# Patient Record
Sex: MALE | Race: WHITE | NOT HISPANIC OR LATINO | URBAN - METROPOLITAN AREA
[De-identification: names, ages, dates, MRNs, and addresses within clinical notes are randomized per-mention and may not be internally consistent; named-entity substitution may affect disease eponyms.]

---

## 2017-07-12 ENCOUNTER — OUTPATIENT (OUTPATIENT)
Dept: OUTPATIENT SERVICES | Facility: HOSPITAL | Age: 67
LOS: 1 days | Discharge: HOME | End: 2017-07-12

## 2017-07-12 DIAGNOSIS — Z01.810 ENCOUNTER FOR PREPROCEDURAL CARDIOVASCULAR EXAMINATION: ICD-10-CM

## 2017-07-12 DIAGNOSIS — F10.239 ALCOHOL DEPENDENCE WITH WITHDRAWAL, UNSPECIFIED: ICD-10-CM

## 2017-07-12 DIAGNOSIS — E03.9 HYPOTHYROIDISM, UNSPECIFIED: ICD-10-CM

## 2017-07-12 DIAGNOSIS — I25.10 ATHEROSCLEROTIC HEART DISEASE OF NATIVE CORONARY ARTERY WITHOUT ANGINA PECTORIS: ICD-10-CM

## 2017-07-12 DIAGNOSIS — E78.00 PURE HYPERCHOLESTEROLEMIA, UNSPECIFIED: ICD-10-CM

## 2017-11-10 ENCOUNTER — OUTPATIENT (OUTPATIENT)
Dept: OUTPATIENT SERVICES | Facility: HOSPITAL | Age: 67
LOS: 1 days | Discharge: HOME | End: 2017-11-10

## 2017-11-10 DIAGNOSIS — E03.9 HYPOTHYROIDISM, UNSPECIFIED: ICD-10-CM

## 2017-11-10 DIAGNOSIS — F10.239 ALCOHOL DEPENDENCE WITH WITHDRAWAL, UNSPECIFIED: ICD-10-CM

## 2017-11-10 DIAGNOSIS — I25.10 ATHEROSCLEROTIC HEART DISEASE OF NATIVE CORONARY ARTERY WITHOUT ANGINA PECTORIS: ICD-10-CM

## 2017-11-10 DIAGNOSIS — Z01.810 ENCOUNTER FOR PREPROCEDURAL CARDIOVASCULAR EXAMINATION: ICD-10-CM

## 2018-03-30 ENCOUNTER — OUTPATIENT (OUTPATIENT)
Dept: OUTPATIENT SERVICES | Facility: HOSPITAL | Age: 68
LOS: 1 days | Discharge: HOME | End: 2018-03-30

## 2018-03-30 DIAGNOSIS — I25.10 ATHEROSCLEROTIC HEART DISEASE OF NATIVE CORONARY ARTERY WITHOUT ANGINA PECTORIS: ICD-10-CM

## 2018-03-30 DIAGNOSIS — Z01.20 ENCOUNTER FOR DENTAL EXAMINATION AND CLEANING WITHOUT ABNORMAL FINDINGS: ICD-10-CM

## 2018-03-30 DIAGNOSIS — E78.00 PURE HYPERCHOLESTEROLEMIA, UNSPECIFIED: ICD-10-CM

## 2020-08-12 ENCOUNTER — APPOINTMENT (OUTPATIENT)
Dept: CARDIOLOGY | Facility: CLINIC | Age: 70
End: 2020-08-12
Payer: COMMERCIAL

## 2020-08-12 VITALS
WEIGHT: 237 LBS | BODY MASS INDEX: 38.09 KG/M2 | HEIGHT: 66 IN | SYSTOLIC BLOOD PRESSURE: 180 MMHG | DIASTOLIC BLOOD PRESSURE: 100 MMHG

## 2020-08-12 DIAGNOSIS — Z86.79 PERSONAL HISTORY OF OTHER DISEASES OF THE CIRCULATORY SYSTEM: ICD-10-CM

## 2020-08-12 DIAGNOSIS — Z87.891 PERSONAL HISTORY OF NICOTINE DEPENDENCE: ICD-10-CM

## 2020-08-12 DIAGNOSIS — Z78.9 OTHER SPECIFIED HEALTH STATUS: ICD-10-CM

## 2020-08-12 DIAGNOSIS — I50.1 LEFT VENTRICULAR FAILURE, UNSPECIFIED: ICD-10-CM

## 2020-08-12 DIAGNOSIS — Z98.61 CORONARY ANGIOPLASTY STATUS: ICD-10-CM

## 2020-08-12 PROCEDURE — 99215 OFFICE O/P EST HI 40 MIN: CPT

## 2020-08-12 PROCEDURE — 93000 ELECTROCARDIOGRAM COMPLETE: CPT

## 2020-08-12 NOTE — HISTORY OF PRESENT ILLNESS
[FreeTextEntry1] : 70-yo male who developed progressive WALLS in 2012 and was found to have 2-vessel CAD. S/p OWNE of proximal LAD and LCX (both 3.5 mm Promus), complete resolution of all symptoms after that. \par \par Admitted to [facility] in 2016 with alcohol intoxication, uncontrolled HTN, CP. MPI normal.\par \par Lost to f/u for 2 years, mostly compliant with medications but BP very poorly controlled. Patient reports weight gain, progressive WALLS, orthopnea and cough at night. No palpitations or CP.

## 2020-08-12 NOTE — DISCUSSION/SUMMARY
[Persistent Atrial Fibrillation] : persistent atrial fibrillation [Essential Hypertension] : essential hypertension [Left Ventricular Failure] : left ventricular failure [Decompensated] : decompensated [FreeTextEntry1] : Hypertensive heart disease with heart failure now, A-fib (onset unclear).\par CHADS Vasc score 4.\par H/o CAD, 2-vessel PCI. LVEF normal in the past.\par \par Plan:\par Low-salt diet.\par Continue Carvedilol bid, Telmisartan in the morning.\par Add Amlodipine 5 mg in the evening.\par D/c HCTZ, start Furosemide 80 mg bid with K-dur.\par Start Eliquis 5 mg bid (samples given).\par 2D ECHO.\par BW pending.\par F/u in 1 week.\par \par Leonardo Syed MD\par

## 2020-08-12 NOTE — PHYSICAL EXAM
[Normal Appearance] : normal appearance [General Appearance - Well Developed] : well developed [Well Groomed] : well groomed [No Deformities] : no deformities [General Appearance - In No Acute Distress] : no acute distress [General Appearance - Well Nourished] : well nourished [Normal Conjunctiva] : the conjunctiva exhibited no abnormalities [Eyelids - No Xanthelasma] : the eyelids demonstrated no xanthelasmas [Normal Rate] : the respiratory rate was normal [Normal Breath Sounds] : normal bilateral breath sounds [Rales / Crackles Right Midlung Field] : crackles were heard over the right midlung field [Dullness Right Base] : dullness to percussion present over the lower right lung [Wheezing Bilaterally] : no wheezing was heard [Rales / Crackles Right Base] : crackles were heard over the right base [Normal] : normal [Bradycardia] : bradycardic [No Precordial Heave] : no precordial heave was noted [Irregularly Irregular] : irregularly irregular [Normal S1] : normal S1 [Normal S2] : normal S2 [II] : a grade 2 [2+] : left 2+ [___ +] : bilateral [unfilled]U+ pitting edema to the ankles [Abdomen Soft] : soft [Abdomen Tenderness] : non-tender [Bowel Sounds] : normal bowel sounds [Abdomen Mass (___ Cm)] : no abdominal mass palpated [Cyanosis, Localized] : no localized cyanosis [Skin Color & Pigmentation] : normal skin color and pigmentation [] : no rash [Affect] : the affect was normal [Mood] : the mood was normal [Oriented To Time, Place, And Person] : oriented to person, place, and time [Acc Muscles Use] : no accessory muscle use [Distress] : no respiratory distress [S4] : no S4 [S3] : no S3

## 2020-08-12 NOTE — REASON FOR VISIT
[Follow-Up - Clinic] : a clinic follow-up of [Atrial Fibrillation] : atrial fibrillation [Dyspnea] : dyspnea [Hypertension] : hypertension

## 2020-08-12 NOTE — REVIEW OF SYSTEMS
[Feeling Fatigued] : feeling fatigued [Lower Ext Edema] : lower extremity edema [see HPI] : see HPI [Negative] : Endocrine [Fever] : no fever [Chills] : no chills [Headache] : no headache [Blurry Vision] : no blurred vision [Seeing Double (Diplopia)] : no diplopia [Wheezing] : no wheezing [Leg Claudication] : no intermittent leg claudication [Easy Bruising] : no tendency for easy bruising [Skin: A Rash] : no rash: [Anxiety] : no anxiety

## 2020-08-18 ENCOUNTER — APPOINTMENT (OUTPATIENT)
Dept: CARDIOLOGY | Facility: CLINIC | Age: 70
End: 2020-08-18
Payer: COMMERCIAL

## 2020-08-18 VITALS
SYSTOLIC BLOOD PRESSURE: 148 MMHG | WEIGHT: 234 LBS | DIASTOLIC BLOOD PRESSURE: 84 MMHG | BODY MASS INDEX: 37.61 KG/M2 | HEIGHT: 66 IN

## 2020-08-18 PROCEDURE — 99214 OFFICE O/P EST MOD 30 MIN: CPT | Mod: 25

## 2020-08-18 PROCEDURE — 93306 TTE W/DOPPLER COMPLETE: CPT

## 2020-08-18 PROCEDURE — 93000 ELECTROCARDIOGRAM COMPLETE: CPT | Mod: 59

## 2020-08-18 NOTE — HISTORY OF PRESENT ILLNESS
[FreeTextEntry1] : 70-yo male who developed progressive WALLS in 2012 and was found to have 2-vessel CAD. S/p OWEN of proximal LAD and LCX (both 3.5 mm Promus), complete resolution of all symptoms after that. \par \par Admitted to [facility] in 2016 with alcohol intoxication, uncontrolled HTN, CP. MPI normal.\par \par Lost to f/u for 2 years, mostly compliant with medications but BP very poorly controlled. Patient reports weight gain, progressive WALLS, orthopnea and cough at night. No palpitations or CP.\par \par Since last visit, patient reports significant improvement of WALLS and SOB at night, still feels SOB with ambulation. BP is still elevated.

## 2020-08-18 NOTE — PHYSICAL EXAM
[General Appearance - Well Developed] : well developed [Normal Appearance] : normal appearance [Well Groomed] : well groomed [General Appearance - Well Nourished] : well nourished [No Deformities] : no deformities [General Appearance - In No Acute Distress] : no acute distress [Normal Conjunctiva] : the conjunctiva exhibited no abnormalities [Eyelids - No Xanthelasma] : the eyelids demonstrated no xanthelasmas [Respiration, Rhythm And Depth] : normal respiratory rhythm and effort [Exaggerated Use Of Accessory Muscles For Inspiration] : no accessory muscle use [Auscultation Breath Sounds / Voice Sounds] : lungs were clear to auscultation bilaterally [Normal] : normal [No Precordial Heave] : no precordial heave was noted [Bradycardia] : bradycardic [Irregularly Irregular] : irregularly irregular [Normal S1] : normal S1 [Normal S2] : normal S2 [II] : a grade 2 [2+] : left 2+ [___ +] : bilateral [unfilled]U+ pitting edema to the ankles [Bowel Sounds] : normal bowel sounds [Abdomen Soft] : soft [Abdomen Tenderness] : non-tender [Abdomen Mass (___ Cm)] : no abdominal mass palpated [Skin Color & Pigmentation] : normal skin color and pigmentation [] : no rash [Oriented To Time, Place, And Person] : oriented to person, place, and time [No Anxiety] : not feeling anxious [S3] : no S3 [S4] : no S4

## 2020-08-18 NOTE — ASSESSMENT
[FreeTextEntry1] : ECG: A-fib, VR 59/min, no ST changes.\par \par 2D ECHO 08/18/20:\par LVEF 59%\par Mild LAE, NISHANT\par Mild MR, TR, PASP 60\par \par GFR 77\par HDL 41\par

## 2020-08-18 NOTE — REVIEW OF SYSTEMS
[Feeling Fatigued] : feeling fatigued [Lower Ext Edema] : lower extremity edema [see HPI] : see HPI [Negative] : Endocrine [Fever] : no fever [Headache] : no headache [Chills] : no chills [Blurry Vision] : no blurred vision [Seeing Double (Diplopia)] : no diplopia [Leg Claudication] : no intermittent leg claudication [Wheezing] : no wheezing [Skin: A Rash] : no rash: [Anxiety] : no anxiety [Easy Bruising] : no tendency for easy bruising

## 2020-08-18 NOTE — DISCUSSION/SUMMARY
[FreeTextEntry1] : Hypertensive heart disease.\par Acute on chronic diastolic CHF. Moderate to severe PHT.\par Persistent A-fib.\par \par Plan:\par Continue Furosemide and K-dur for 1 more week, then cut in half.\par Will replace Amlodipine with Nifedipine.\par Continue Coreg and Telmisartan.\par Repeat BW ordered by PMD.\par F/u in 2 weeks.\par \par Leonardo Syed MD\par

## 2020-08-19 ENCOUNTER — RECORD ABSTRACTING (OUTPATIENT)
Age: 70
End: 2020-08-19

## 2020-08-19 DIAGNOSIS — Z86.79 PERSONAL HISTORY OF OTHER DISEASES OF THE CIRCULATORY SYSTEM: ICD-10-CM

## 2020-08-19 DIAGNOSIS — Z98.61 CORONARY ANGIOPLASTY STATUS: ICD-10-CM

## 2020-08-19 DIAGNOSIS — Z86.39 PERSONAL HISTORY OF OTHER ENDOCRINE, NUTRITIONAL AND METABOLIC DISEASE: ICD-10-CM

## 2020-08-19 DIAGNOSIS — Z78.9 OTHER SPECIFIED HEALTH STATUS: ICD-10-CM

## 2020-09-01 ENCOUNTER — APPOINTMENT (OUTPATIENT)
Dept: CARDIOLOGY | Facility: CLINIC | Age: 70
End: 2020-09-01
Payer: COMMERCIAL

## 2020-09-01 VITALS
SYSTOLIC BLOOD PRESSURE: 150 MMHG | WEIGHT: 233 LBS | HEIGHT: 68 IN | BODY MASS INDEX: 35.31 KG/M2 | DIASTOLIC BLOOD PRESSURE: 80 MMHG

## 2020-09-01 PROCEDURE — 99214 OFFICE O/P EST MOD 30 MIN: CPT

## 2020-09-01 PROCEDURE — 93000 ELECTROCARDIOGRAM COMPLETE: CPT

## 2020-09-01 RX ORDER — AMLODIPINE BESYLATE 5 MG/1
5 TABLET ORAL
Qty: 90 | Refills: 1 | Status: DISCONTINUED | COMMUNITY
Start: 2020-08-12 | End: 2020-09-01

## 2020-09-01 NOTE — PHYSICAL EXAM
[General Appearance - Well Developed] : well developed [Normal Appearance] : normal appearance [Well Groomed] : well groomed [General Appearance - Well Nourished] : well nourished [No Deformities] : no deformities [General Appearance - In No Acute Distress] : no acute distress [Normal Conjunctiva] : the conjunctiva exhibited no abnormalities [Eyelids - No Xanthelasma] : the eyelids demonstrated no xanthelasmas [Respiration, Rhythm And Depth] : normal respiratory rhythm and effort [Exaggerated Use Of Accessory Muscles For Inspiration] : no accessory muscle use [Auscultation Breath Sounds / Voice Sounds] : lungs were clear to auscultation bilaterally [Normal] : normal [No Precordial Heave] : no precordial heave was noted [Bradycardia] : bradycardic [Irregularly Irregular] : irregularly irregular [Normal S1] : normal S1 [Normal S2] : normal S2 [S3] : no S3 [S4] : no S4 [II] : a grade 2 [2+] : left 2+ [No Pitting Edema] : no pitting edema present [Bowel Sounds] : normal bowel sounds [Abdomen Soft] : soft [Abdomen Tenderness] : non-tender [Abdomen Mass (___ Cm)] : no abdominal mass palpated [Cyanosis, Localized] : no localized cyanosis [Skin Color & Pigmentation] : normal skin color and pigmentation [] : no rash [Oriented To Time, Place, And Person] : oriented to person, place, and time [No Anxiety] : not feeling anxious

## 2020-09-01 NOTE — REVIEW OF SYSTEMS
[Fever] : no fever [Headache] : no headache [Chills] : no chills [Feeling Fatigued] : feeling fatigued [Blurry Vision] : no blurred vision [Seeing Double (Diplopia)] : no diplopia [Lower Ext Edema] : no extremity edema [Leg Claudication] : no intermittent leg claudication [see HPI] : see HPI [Wheezing] : no wheezing [Skin: A Rash] : no rash: [Anxiety] : no anxiety [Easy Bruising] : no tendency for easy bruising [Negative] : Endocrine

## 2020-09-01 NOTE — HISTORY OF PRESENT ILLNESS
[FreeTextEntry1] : 70-yo male who developed progressive WALLS in 2012 and was found to have 2-vessel CAD. S/p OWEN of proximal LAD and LCX (both 3.5 mm Promus), complete resolution of all symptoms after that. \par \par Admitted to [facility] in 2016 with alcohol intoxication, uncontrolled HTN, CP. MPI normal.\par \par Lost to f/u for 2 years, mostly compliant with medications but BP very poorly controlled. Patient reports weight gain, progressive WALLS, orthopnea and cough at night. No palpitations or CP.\par \par Since last visit, patient reports significant improvement of SOB with ambulation. BP is still elevated.

## 2020-09-01 NOTE — DISCUSSION/SUMMARY
[FreeTextEntry1] : 70-yo with hypertensive HD, A-fib (unknown onset), diastolic CHF. Patient is close to euvololemic today, BP is still elevated.\par \par Plan:\par Continue Furosemide 40 mg bid.\par Increase Procardia XL to 60 mg in the evening.\par Repeat BW pending.\par F/u in 2 weeks.\par \par Leonardo Syed MD\par

## 2020-09-01 NOTE — ASSESSMENT
[FreeTextEntry1] : ECG: A-fib, VR 63/min, no ST changes.\par \par 2D ECHO 08/18/20:\par LVEF 59%\par Mild LAE, NISHANT\par Mild MR, TR, PASP 60\par

## 2020-09-15 ENCOUNTER — APPOINTMENT (OUTPATIENT)
Dept: CARDIOLOGY | Facility: CLINIC | Age: 70
End: 2020-09-15
Payer: COMMERCIAL

## 2020-09-15 VITALS
WEIGHT: 236 LBS | BODY MASS INDEX: 35.77 KG/M2 | HEIGHT: 68 IN | DIASTOLIC BLOOD PRESSURE: 78 MMHG | TEMPERATURE: 98.3 F | SYSTOLIC BLOOD PRESSURE: 140 MMHG

## 2020-09-15 PROCEDURE — 93000 ELECTROCARDIOGRAM COMPLETE: CPT

## 2020-09-15 PROCEDURE — 99214 OFFICE O/P EST MOD 30 MIN: CPT

## 2020-09-15 RX ORDER — TELMISARTAN AND HYDROCHLOROTHIAZIDE 80; 12.5 MG/1; MG/1
80-12.5 TABLET ORAL
Refills: 0 | Status: DISCONTINUED | COMMUNITY
End: 2020-09-15

## 2020-09-15 NOTE — HISTORY OF PRESENT ILLNESS
[FreeTextEntry1] : 70-yo male who developed progressive WALLS in 2012 and was found to have 2-vessel CAD. S/p OWEN of proximal LAD and LCX (both 3.5 mm Promus), complete resolution of all symptoms after that. \par \par Admitted to [facility] in 2016 with alcohol intoxication, uncontrolled HTN, CP. MPI normal.\par \par Lost to f/u for 2 years, mostly compliant with medications but BP very poorly controlled. Patient reports weight gain, progressive WALLS, orthopnea and cough at night. No palpitations or CP.\par \par Since last visit, patient reports significant improvement of SOB with ambulation. BP has also improved..\par \par 2D ECHO 08/18/20:\par LVEF 59%\par Mild LAE, NISHANT\par Mild MR, TR, PASP 60

## 2020-09-15 NOTE — ASSESSMENT
[FreeTextEntry1] : 70-yo male with h/o ischemic heart disease, hypertensive HD, diastolic CHF. New-onset A-fib in the last few months with CHF decompensation. Patient appears euvolemic now.\par [id number] Vasc score 4.\par \par Plan:\par Continue Lasix 40 mg bid with K-dur.\par Temisartan in the morning, Coreg bid, NIfedipine 60 mg at night.\par Eliquis 5 mg bid.\par Will schedule MICHAEL/CV at [facility].\par \par Leonardo Syed MD\par

## 2020-09-15 NOTE — REVIEW OF SYSTEMS
[Feeling Fatigued] : feeling fatigued [see HPI] : see HPI [Negative] : Gastrointestinal [Fever] : no fever [Chills] : no chills [Headache] : no headache [Lower Ext Edema] : no extremity edema [Seeing Double (Diplopia)] : no diplopia [Blurry Vision] : no blurred vision [Leg Claudication] : no intermittent leg claudication [Wheezing] : no wheezing [Skin: A Rash] : no rash: [Anxiety] : no anxiety [Easy Bruising] : no tendency for easy bruising

## 2020-09-21 LAB
ANION GAP SERPL CALC-SCNC: 13 MMOL/L
BASOPHILS # BLD AUTO: 0.03 K/UL
BASOPHILS NFR BLD AUTO: 0.5 %
BUN SERPL-MCNC: 18 MG/DL
CALCIUM SERPL-MCNC: 9 MG/DL
CHLORIDE SERPL-SCNC: 102 MMOL/L
CO2 SERPL-SCNC: 25 MMOL/L
CREAT SERPL-MCNC: 0.9 MG/DL
EOSINOPHIL # BLD AUTO: 0.08 K/UL
EOSINOPHIL NFR BLD AUTO: 1.3 %
GLUCOSE SERPL-MCNC: 137 MG/DL
HCT VFR BLD CALC: 43.1 %
HGB BLD-MCNC: 14.2 G/DL
IMM GRANULOCYTES NFR BLD AUTO: 0.3 %
INR PPP: 1.16 RATIO
LYMPHOCYTES # BLD AUTO: 2.68 K/UL
LYMPHOCYTES NFR BLD AUTO: 43 %
MAN DIFF?: NORMAL
MCHC RBC-ENTMCNC: 30.3 PG
MCHC RBC-ENTMCNC: 32.9 G/DL
MCV RBC AUTO: 92.1 FL
MONOCYTES # BLD AUTO: 0.44 K/UL
MONOCYTES NFR BLD AUTO: 7.1 %
NEUTROPHILS # BLD AUTO: 2.98 K/UL
NEUTROPHILS NFR BLD AUTO: 47.8 %
PLATELET # BLD AUTO: 163 K/UL
POTASSIUM SERPL-SCNC: 4 MMOL/L
PT BLD: 13.3 SEC
RBC # BLD: 4.68 M/UL
RBC # FLD: 13 %
SODIUM SERPL-SCNC: 140 MMOL/L
WBC # FLD AUTO: 6.23 K/UL

## 2020-09-26 ENCOUNTER — LABORATORY RESULT (OUTPATIENT)
Age: 70
End: 2020-09-26

## 2020-09-26 ENCOUNTER — OUTPATIENT (OUTPATIENT)
Dept: OUTPATIENT SERVICES | Facility: HOSPITAL | Age: 70
LOS: 1 days | Discharge: HOME | End: 2020-09-26

## 2020-09-26 DIAGNOSIS — Z11.59 ENCOUNTER FOR SCREENING FOR OTHER VIRAL DISEASES: ICD-10-CM

## 2020-09-29 ENCOUNTER — OUTPATIENT (OUTPATIENT)
Dept: OUTPATIENT SERVICES | Facility: HOSPITAL | Age: 70
LOS: 1 days | Discharge: HOME | End: 2020-09-29
Payer: COMMERCIAL

## 2020-09-29 VITALS
HEART RATE: 84 BPM | WEIGHT: 235.89 LBS | TEMPERATURE: 98 F | OXYGEN SATURATION: 96 % | HEIGHT: 68 IN | SYSTOLIC BLOOD PRESSURE: 173 MMHG | DIASTOLIC BLOOD PRESSURE: 84 MMHG | RESPIRATION RATE: 15 BRPM

## 2020-09-29 VITALS
HEIGHT: 68 IN | SYSTOLIC BLOOD PRESSURE: 173 MMHG | RESPIRATION RATE: 12 BRPM | WEIGHT: 235.89 LBS | OXYGEN SATURATION: 98 % | HEART RATE: 68 BPM | DIASTOLIC BLOOD PRESSURE: 84 MMHG

## 2020-09-29 DIAGNOSIS — I25.119 ATHEROSCLEROTIC HEART DISEASE OF NATIVE CORONARY ARTERY WITH UNSPECIFIED ANGINA PECTORIS: ICD-10-CM

## 2020-09-29 PROCEDURE — 92960 CARDIOVERSION ELECTRIC EXT: CPT

## 2020-09-29 PROCEDURE — 93320 DOPPLER ECHO COMPLETE: CPT | Mod: 26

## 2020-09-29 PROCEDURE — 93010 ELECTROCARDIOGRAM REPORT: CPT | Mod: 77

## 2020-09-29 PROCEDURE — 93312 ECHO TRANSESOPHAGEAL: CPT | Mod: 26

## 2020-09-29 PROCEDURE — 93325 DOPPLER ECHO COLOR FLOW MAPG: CPT | Mod: 26

## 2020-09-29 PROCEDURE — 93010 ELECTROCARDIOGRAM REPORT: CPT

## 2020-09-29 NOTE — H&P CARDIOLOGY - HISTORY OF PRESENT ILLNESS
70 y /old M here for MICHAEL and Cardioversion, patient reports shortness of breath and chest pressure on exertion   PMH: CHF, CAD, LEFT HF, Obesity, DLD, S/P PTCA 2 Stents 2015, ex-smoker, Sleep Apnea - uses CPAP  PSH: noncontributory   FH: Mother - CAD

## 2020-09-29 NOTE — CHART NOTE - NSCHARTNOTEFT_GEN_A_CORE
POST OPERATIVE PROCEDURAL DOCUMENTATION  PRE-OP DIAGNOSIS: Afib    POST-OP DIAGNOSIS: afib s/p DCCV to SR    PROCEDURE: Transesophageal echocardiogram, DCCV    Primary Physician:  Dr. Syed  Assistant: Dr. Yeh    ANESTHESIA TYPE  [  ] General Anesthesia  [ x ] Conscious Sedation  [  ] Local/Regional    CONDITION  [  ] Critical  [  ] Serious  [  ] Fair  [ x ] Good    SPECIMENS REMOVED (IF APPLICABLE): N/A    IMPLANTS (IF APPLICABLE): None    ESTIMATED BLOOD LOSS: None    COMPLICATIONS: None      FINDINGS:    After risks and benefits of procedures were explained, informed consent was obtained and placed in chart.   The patient received topical anesthetic to the oropharynx with benzocaine spray.  Refer to Anesthesia note for sedation details.  The MICHAEL probe was passed into the esophagus without difficulty.  MICHAEL images were obtained.  The MICHAEL probe was removed without difficulty and examined.  There was no evidence for bleeding.  The patient tolerated the procedure well without any immediate MICHAEL-related complications.      Preliminary Findings:  FLACA: Left atrial appendage was clear of clot and smoke.  LV: LVEF was estimated at 65-70%  MV: mild MR   AV: trace AR   TV: mild TR   IAS: intact  moderately enlarged RA and LA     Patient successfully converted to sinus rhythm with synchronized  200J of direct current cardioversion.    Plan:  cont all home meds including Eliquis   out pt cardiology follow up

## 2020-09-29 NOTE — H&P CARDIOLOGY - SURGICAL SITE INCISION
abnormal EKG, cannot rule out anterior infarct  LXW=891  seen by cardiologist abnormal EKG, cannot rule out anterior infarct  WJF=327 no abnormal EKG, cannot rule out anterior infarct  CVI=592 abnormal EKG, cannot rule out anterior infarct  EXZ=143  seen by cardiologist

## 2020-09-29 NOTE — ASU PATIENT PROFILE, ADULT - PMH
Afib    Congestive heart failure    Coronary artery disease    Coronary stent patent    High cholesterol    Hypertension

## 2020-09-30 DIAGNOSIS — I48.91 UNSPECIFIED ATRIAL FIBRILLATION: ICD-10-CM

## 2020-10-06 ENCOUNTER — APPOINTMENT (OUTPATIENT)
Dept: CARDIOLOGY | Facility: CLINIC | Age: 70
End: 2020-10-06
Payer: COMMERCIAL

## 2020-10-06 VITALS
WEIGHT: 233 LBS | BODY MASS INDEX: 35.31 KG/M2 | DIASTOLIC BLOOD PRESSURE: 80 MMHG | HEIGHT: 68 IN | SYSTOLIC BLOOD PRESSURE: 130 MMHG

## 2020-10-06 DIAGNOSIS — I50.33 ACUTE ON CHRONIC DIASTOLIC (CONGESTIVE) HEART FAILURE: ICD-10-CM

## 2020-10-06 DIAGNOSIS — Z86.79 PERSONAL HISTORY OF OTHER DISEASES OF THE CIRCULATORY SYSTEM: ICD-10-CM

## 2020-10-06 PROCEDURE — 99214 OFFICE O/P EST MOD 30 MIN: CPT

## 2020-10-06 PROCEDURE — 93000 ELECTROCARDIOGRAM COMPLETE: CPT

## 2020-10-06 RX ORDER — POTASSIUM CHLORIDE 1500 MG/1
20 TABLET, EXTENDED RELEASE ORAL
Qty: 180 | Refills: 0 | Status: DISCONTINUED | COMMUNITY
Start: 2020-08-12 | End: 2020-10-06

## 2020-10-06 RX ORDER — CLOPIDOGREL BISULFATE 75 MG/1
75 TABLET, FILM COATED ORAL ONCE
Refills: 0 | Status: DISCONTINUED | COMMUNITY

## 2020-10-06 RX ORDER — NIFEDIPINE 30 MG/1
30 TABLET, EXTENDED RELEASE ORAL
Qty: 90 | Refills: 0 | Status: DISCONTINUED | COMMUNITY
Start: 2020-08-18

## 2020-10-06 RX ORDER — NIFEDIPINE 60 MG/1
60 TABLET, EXTENDED RELEASE ORAL
Qty: 90 | Refills: 1 | Status: DISCONTINUED | COMMUNITY
Start: 2020-08-18 | End: 2020-10-06

## 2020-10-06 NOTE — HISTORY OF PRESENT ILLNESS
[FreeTextEntry1] : 70-yo male who developed progressive WALLS in 2012 and was found to have 2-vessel CAD. S/p OWEN of proximal LAD and LCX (both 3.5 mm Promus), complete resolution of all symptoms after that. \par \par Admitted to [facility] in 2016 with alcohol intoxication, uncontrolled HTN, CP. MPI normal.\par \par Lost to f/u for 2 years, mostly compliant with medications but BP very poorly controlled. Patient reports weight gain, progressive WALLS, orthopnea and cough at night. No palpitations or CP.\par \par Improved with rate control and diuresis. MICHAEL/CV performed at [facility], patient felt better initially but developed increased WALLS and edema again 2-3 days later.\par \par 2D ECHO 08/18/20:\par LVEF 59%\par Mild LAE, NISHANT\par Mild MR, TR, PASP 60 \par

## 2020-10-06 NOTE — PHYSICAL EXAM
[General Appearance - Well Developed] : well developed [General Appearance - Well Nourished] : well nourished [Normal Conjunctiva] : the conjunctiva exhibited no abnormalities [No Oral Pallor] : no oral pallor [Auscultation Breath Sounds / Voice Sounds] : lungs were clear to auscultation bilaterally [Murmurs] : no murmurs present [Edema] : no peripheral edema present [Abdomen Soft] : soft [Abnormal Walk] : normal gait [Cyanosis, Localized] : no localized cyanosis [Oriented To Time, Place, And Person] : oriented to person, place, and time [Normal Appearance] : normal appearance [Well Groomed] : well groomed [No Deformities] : no deformities [General Appearance - In No Acute Distress] : no acute distress [Eyelids - No Xanthelasma] : the eyelids demonstrated no xanthelasmas [Respiration, Rhythm And Depth] : normal respiratory rhythm and effort [Exaggerated Use Of Accessory Muscles For Inspiration] : no accessory muscle use [Normal] : normal [No Precordial Heave] : no precordial heave was noted [Bradycardia] : bradycardic [Irregularly Irregular] : irregularly irregular [Normal S1] : normal S1 [Normal S2] : normal S2 [II] : a grade 2 [2+] : left 2+ [___ +] : bilateral [unfilled]U+ pitting edema to the ankles [Bowel Sounds] : normal bowel sounds [Abdomen Tenderness] : non-tender [Abdomen Mass (___ Cm)] : no abdominal mass palpated [Skin Color & Pigmentation] : normal skin color and pigmentation [] : no rash [No Anxiety] : not feeling anxious [FreeTextEntry1] : no JVD [S3] : no S3 [S4] : no S4

## 2020-10-06 NOTE — ASSESSMENT
[FreeTextEntry1] : 70-yo male with h/o ischemic heart disease, hypertensive HD, diastolic CHF. New-onset A-fib in the last few months with CHF decompensation.\par [id number] Vasc score 4\par S/p recent MICHAEL/DCCV- but reverted back to A-fib again, became more symptomatic.\par \par \par Plan:\par Increase lasix to 80 mg BID for a few days.\par Start Amiodarone 200 mg BID x 2 weeks.\par F/u in 2 weeks and will consider repeat DCCV if still in Afib.\par

## 2020-10-07 PROBLEM — E78.00 PURE HYPERCHOLESTEROLEMIA, UNSPECIFIED: Chronic | Status: ACTIVE | Noted: 2020-09-29

## 2020-10-07 PROBLEM — I50.9 HEART FAILURE, UNSPECIFIED: Chronic | Status: ACTIVE | Noted: 2020-09-29

## 2020-10-07 PROBLEM — Z95.5 PRESENCE OF CORONARY ANGIOPLASTY IMPLANT AND GRAFT: Chronic | Status: ACTIVE | Noted: 2020-09-29

## 2020-10-07 PROBLEM — I48.91 UNSPECIFIED ATRIAL FIBRILLATION: Chronic | Status: ACTIVE | Noted: 2020-09-29

## 2020-10-07 PROBLEM — I10 ESSENTIAL (PRIMARY) HYPERTENSION: Chronic | Status: ACTIVE | Noted: 2020-09-29

## 2020-10-07 PROBLEM — I25.10 ATHEROSCLEROTIC HEART DISEASE OF NATIVE CORONARY ARTERY WITHOUT ANGINA PECTORIS: Chronic | Status: ACTIVE | Noted: 2020-09-29

## 2020-10-22 ENCOUNTER — APPOINTMENT (OUTPATIENT)
Dept: CARDIOLOGY | Facility: CLINIC | Age: 70
End: 2020-10-22
Payer: COMMERCIAL

## 2020-10-22 VITALS
DIASTOLIC BLOOD PRESSURE: 70 MMHG | WEIGHT: 230 LBS | BODY MASS INDEX: 34.86 KG/M2 | SYSTOLIC BLOOD PRESSURE: 134 MMHG | HEIGHT: 68 IN

## 2020-10-22 PROCEDURE — 99072 ADDL SUPL MATRL&STAF TM PHE: CPT

## 2020-10-22 PROCEDURE — 99214 OFFICE O/P EST MOD 30 MIN: CPT

## 2020-10-22 PROCEDURE — 93000 ELECTROCARDIOGRAM COMPLETE: CPT

## 2020-10-22 RX ORDER — APIXABAN 5 MG/1
5 TABLET, FILM COATED ORAL
Qty: 180 | Refills: 1 | Status: DISCONTINUED | COMMUNITY
Start: 2020-08-12 | End: 2020-10-22

## 2020-10-22 NOTE — HISTORY OF PRESENT ILLNESS
[FreeTextEntry1] : 70-yo male who developed progressive WALLS in 2012 and was found to have 2-vessel CAD. S/p OWEN of proximal LAD and LCX (both 3.5 mm Promus), complete resolution of all symptoms after that. \par \par Admitted to [facility] in 2016 with alcohol intoxication, uncontrolled HTN, CP. MPI normal.\par \par Lost to f/u for 2 years, mostly compliant with medications but BP very poorly controlled. Patient reports weight gain, progressive WALLS, orthopnea and cough at night. No palpitations or CP.\par \par Improved with rate control and diuresis. MICHAEL/CV performed at [facility], patient converted back to A-fib by next visit. SOB, edema improved again since then. \par \par 2D ECHO 08/18/20:\par LVEF 59%\par Mild LAE, NISHANT\par Mild MR, TR, PASP 60 \par

## 2020-10-22 NOTE — PHYSICAL EXAM
[General Appearance - Well Developed] : well developed [Normal Appearance] : normal appearance [Well Groomed] : well groomed [General Appearance - Well Nourished] : well nourished [No Deformities] : no deformities [General Appearance - In No Acute Distress] : no acute distress [Normal Conjunctiva] : the conjunctiva exhibited no abnormalities [Eyelids - No Xanthelasma] : the eyelids demonstrated no xanthelasmas [Respiration, Rhythm And Depth] : normal respiratory rhythm and effort [Exaggerated Use Of Accessory Muscles For Inspiration] : no accessory muscle use [Auscultation Breath Sounds / Voice Sounds] : lungs were clear to auscultation bilaterally [Normal] : normal [No Precordial Heave] : no precordial heave was noted [Bradycardia] : bradycardic [Irregularly Irregular] : irregularly irregular [Normal S1] : normal S1 [Normal S2] : normal S2 [S3] : no S3 [S4] : no S4 [II] : a grade 2 [2+] : left 2+ [___ +] : bilateral [unfilled]U+ pitting edema to the ankles [Bowel Sounds] : normal bowel sounds [Abdomen Soft] : soft [Abdomen Tenderness] : non-tender [Abdomen Mass (___ Cm)] : no abdominal mass palpated [Cyanosis, Localized] : no localized cyanosis [Skin Color & Pigmentation] : normal skin color and pigmentation [] : no rash [Oriented To Time, Place, And Person] : oriented to person, place, and time [No Anxiety] : not feeling anxious

## 2020-10-22 NOTE — ASSESSMENT
[FreeTextEntry1] : 70-yo male with h/o ischemic heart disease, hypertensive HD, diastolic CHF. New-onset A-fib in the last few months with CHF decompensation.\par [id number] Vasc score 4\par S/p recent MICHAEL/DCCV- but reverted back to A-fib again. Amiodarone started 1 week ago, still in A-fib today.\par \par \par Plan:\par Continue Furosemide 40 mg BID.\par Reduce Amiodarone to 200 mg daily.\par F/u in 4 weeks, will consider repeat DCCV if still in Afib.\par \par Leonardo Syed MD\par

## 2020-11-01 ENCOUNTER — RX RENEWAL (OUTPATIENT)
Age: 70
End: 2020-11-01

## 2020-12-01 ENCOUNTER — APPOINTMENT (OUTPATIENT)
Dept: CARDIOLOGY | Facility: CLINIC | Age: 70
End: 2020-12-01
Payer: COMMERCIAL

## 2020-12-01 VITALS
BODY MASS INDEX: 33.34 KG/M2 | SYSTOLIC BLOOD PRESSURE: 146 MMHG | HEIGHT: 68 IN | DIASTOLIC BLOOD PRESSURE: 70 MMHG | WEIGHT: 220 LBS

## 2020-12-01 DIAGNOSIS — E66.9 OBESITY, UNSPECIFIED: ICD-10-CM

## 2020-12-01 PROCEDURE — 99214 OFFICE O/P EST MOD 30 MIN: CPT

## 2020-12-01 PROCEDURE — 93000 ELECTROCARDIOGRAM COMPLETE: CPT

## 2020-12-01 PROCEDURE — 99072 ADDL SUPL MATRL&STAF TM PHE: CPT

## 2020-12-01 RX ORDER — POTASSIUM CHLORIDE 1500 MG/1
20 TABLET, FILM COATED, EXTENDED RELEASE ORAL TWICE DAILY
Qty: 180 | Refills: 1 | Status: DISCONTINUED | COMMUNITY
Start: 2020-08-12 | End: 2020-12-01

## 2020-12-01 NOTE — HISTORY OF PRESENT ILLNESS
[FreeTextEntry1] : 70-yo male who developed progressive WALLS in 2012 and was found to have 2-vessel CAD. S/p OWEN of proximal LAD and LCX (both 3.5 mm Promus), complete resolution of all symptoms after that. \par \par Admitted to [facility] in 2016 with alcohol intoxication, uncontrolled HTN, CP. MPI normal.\par \par Lost to f/u for 2 years, mostly compliant with medications but BP very poorly controlled. Patient presented with weight gain, progressive WALLS, orthopnea and cough at night. No palpitations or CP.\par \par Improved with rate control and diuresis. MICHAEL/CV performed at [facility], patient converted back to A-fib by next visit. SOB, edema improved again since then. \par \par 2D ECHO 08/18/20:\par LVEF 59%\par Mild LAE, NISHANT\par Mild MR, TR, PASP 60 \par

## 2020-12-01 NOTE — ASSESSMENT
[FreeTextEntry1] : 70-yo male with h/o ischemic heart disease, hypertensive HD, diastolic CHF. New-onset A-fib in the last few months with CHF decompensation.\par [id number] Vasc score 4\par S/p recent MICHAEL/DCCV- but reverted back to A-fib again. Amiodarone started 6 weeks ago, still in A-fib today.\par \par \par Plan:\par Continue Furosemide 40 mg BID.\par Continue Amiodarone to 200 mg daily.\par MICHAEL/CV discussed with patient, he prefers to wait till after the holidays. Will call me to schedule.\par F/u in 3 months.\par \par Leonrado Syed MD\par

## 2020-12-30 LAB
ALBUMIN SERPL ELPH-MCNC: 4.2 G/DL
ALP BLD-CCNC: 89 U/L
ALT SERPL-CCNC: 17 U/L
ANION GAP SERPL CALC-SCNC: 15 MMOL/L
AST SERPL-CCNC: 21 U/L
BASOPHILS # BLD AUTO: 0.03 K/UL
BASOPHILS NFR BLD AUTO: 0.5 %
BILIRUB SERPL-MCNC: 1.1 MG/DL
BUN SERPL-MCNC: 21 MG/DL
CALCIUM SERPL-MCNC: 9.4 MG/DL
CHLORIDE SERPL-SCNC: 100 MMOL/L
CHOLEST SERPL-MCNC: 179 MG/DL
CO2 SERPL-SCNC: 26 MMOL/L
CREAT SERPL-MCNC: 1.2 MG/DL
EOSINOPHIL # BLD AUTO: 0.1 K/UL
EOSINOPHIL NFR BLD AUTO: 1.6 %
GLUCOSE SERPL-MCNC: 117 MG/DL
HCT VFR BLD CALC: 42.8 %
HDLC SERPL-MCNC: 41 MG/DL
HGB BLD-MCNC: 14 G/DL
IMM GRANULOCYTES NFR BLD AUTO: 0.2 %
INR PPP: 1.3 RATIO
LDLC SERPL CALC-MCNC: 128 MG/DL
LYMPHOCYTES # BLD AUTO: 2.55 K/UL
LYMPHOCYTES NFR BLD AUTO: 40.6 %
MAN DIFF?: NORMAL
MCHC RBC-ENTMCNC: 30.3 PG
MCHC RBC-ENTMCNC: 32.7 G/DL
MCV RBC AUTO: 92.6 FL
MONOCYTES # BLD AUTO: 0.59 K/UL
MONOCYTES NFR BLD AUTO: 9.4 %
NEUTROPHILS # BLD AUTO: 3 K/UL
NEUTROPHILS NFR BLD AUTO: 47.7 %
NONHDLC SERPL-MCNC: 138 MG/DL
PLATELET # BLD AUTO: 155 K/UL
POTASSIUM SERPL-SCNC: 3.4 MMOL/L
PROT SERPL-MCNC: 7 G/DL
PT BLD: 14.9 SEC
RBC # BLD: 4.62 M/UL
RBC # FLD: 12.8 %
SODIUM SERPL-SCNC: 141 MMOL/L
TRIGL SERPL-MCNC: 72 MG/DL
WBC # FLD AUTO: 6.28 K/UL

## 2021-01-04 LAB — TSH SERPL-ACNC: 0.07 UIU/ML

## 2021-03-23 ENCOUNTER — APPOINTMENT (OUTPATIENT)
Dept: CARDIOLOGY | Facility: CLINIC | Age: 71
End: 2021-03-23
Payer: COMMERCIAL

## 2021-03-23 VITALS
SYSTOLIC BLOOD PRESSURE: 152 MMHG | WEIGHT: 229 LBS | BODY MASS INDEX: 34.71 KG/M2 | DIASTOLIC BLOOD PRESSURE: 80 MMHG | HEIGHT: 68 IN

## 2021-03-23 PROCEDURE — 93000 ELECTROCARDIOGRAM COMPLETE: CPT

## 2021-03-23 PROCEDURE — 99072 ADDL SUPL MATRL&STAF TM PHE: CPT

## 2021-03-23 PROCEDURE — 99214 OFFICE O/P EST MOD 30 MIN: CPT

## 2021-03-23 RX ORDER — FUROSEMIDE 80 MG/1
80 TABLET ORAL TWICE DAILY
Qty: 30 | Refills: 3 | Status: DISCONTINUED | COMMUNITY
Start: 2020-08-12 | End: 2021-03-23

## 2021-03-23 RX ORDER — AMIODARONE HYDROCHLORIDE 200 MG/1
200 TABLET ORAL
Qty: 90 | Refills: 1 | Status: DISCONTINUED | COMMUNITY
Start: 2020-10-06 | End: 2021-03-23

## 2021-03-23 NOTE — HISTORY OF PRESENT ILLNESS
[FreeTextEntry1] : 70-yo male who developed progressive WALLS in 2012 and was found to have 2-vessel CAD. S/p OWEN of proximal LAD and LCX (both 3.5 mm Promus), complete resolution of all symptoms after that. \par \par Admitted to [facility] in 2016 with alcohol intoxication, uncontrolled HTN, CP. MPI normal.\par \par Lost to f/u for 2 years, mostly compliant with medications but BP very poorly controlled. Patient presented with weight gain, progressive WALLS, orthopnea and cough at night. No palpitations or CP.\par \par Improved with rate control and diuresis. MICHAEL/CV performed at [facility], patient converted back to A-fib by next visit. SOB, edema improved again since then. Patient has stopped Furoisemide.\par \par 2D ECHO 08/18/20:\par LVEF 59%\par Mild LAE, NISHANT\par Mild MR, TR, PASP 60 \par

## 2021-03-23 NOTE — REVIEW OF SYSTEMS
[Fever] : no fever [Headache] : no headache [Chills] : no chills [Blurry Vision] : no blurred vision [Seeing Double (Diplopia)] : no diplopia [Lower Ext Edema] : no extremity edema [Leg Claudication] : no intermittent leg claudication [see HPI] : see HPI [Wheezing] : no wheezing [Skin: A Rash] : no rash: [Anxiety] : no anxiety [Easy Bruising] : no tendency for easy bruising [Negative] : Endocrine

## 2021-03-23 NOTE — ASSESSMENT
[FreeTextEntry1] : 70-yo male with h/o ischemic heart disease, hypertensive HD, diastolic CHF. New-onset A-fib in 2020 with CHF decompensation.\par [id number] Vasc score 4\par S/p MICHAEL/DCCV but reverted back to A-fib again. \par Patient appears euvolemic today.\par \par \par Plan:\par Amiodarone stopped. Patient has decided to continue rate control.\par Furosemide stopped by patient. D/c K-dur as well.  \par Start Doxazosin 2 mg at bedtime.\par Start Rosuvastatin 10 mg at bedtime.\par Fasting blood work ordered.\par F/u in 4 months.\par \par Leonardo Syed MD\par

## 2021-04-07 ENCOUNTER — RX RENEWAL (OUTPATIENT)
Age: 71
End: 2021-04-07

## 2021-06-17 LAB
ALBUMIN SERPL ELPH-MCNC: 4.4 G/DL
ALP BLD-CCNC: 113 U/L
ALT SERPL-CCNC: 13 U/L
ANION GAP SERPL CALC-SCNC: 13 MMOL/L
AST SERPL-CCNC: 21 U/L
BASOPHILS # BLD AUTO: 0.03 K/UL
BASOPHILS NFR BLD AUTO: 0.5 %
BILIRUB SERPL-MCNC: 1.8 MG/DL
BUN SERPL-MCNC: 15 MG/DL
CALCIUM SERPL-MCNC: 9.3 MG/DL
CHLORIDE SERPL-SCNC: 103 MMOL/L
CHOLEST SERPL-MCNC: 90 MG/DL
CO2 SERPL-SCNC: 25 MMOL/L
CREAT SERPL-MCNC: 0.9 MG/DL
EOSINOPHIL # BLD AUTO: 0.08 K/UL
EOSINOPHIL NFR BLD AUTO: 1.3 %
GLUCOSE SERPL-MCNC: 99 MG/DL
HCT VFR BLD CALC: 39.5 %
HDLC SERPL-MCNC: 36 MG/DL
HGB BLD-MCNC: 13.1 G/DL
IMM GRANULOCYTES NFR BLD AUTO: 0.2 %
LDLC SERPL CALC-MCNC: 47 MG/DL
LYMPHOCYTES # BLD AUTO: 2.42 K/UL
LYMPHOCYTES NFR BLD AUTO: 38.2 %
MAN DIFF?: NORMAL
MCHC RBC-ENTMCNC: 30.2 PG
MCHC RBC-ENTMCNC: 33.2 G/DL
MCV RBC AUTO: 91 FL
MONOCYTES # BLD AUTO: 0.56 K/UL
MONOCYTES NFR BLD AUTO: 8.8 %
NEUTROPHILS # BLD AUTO: 3.24 K/UL
NEUTROPHILS NFR BLD AUTO: 51 %
NONHDLC SERPL-MCNC: 54 MG/DL
PLATELET # BLD AUTO: 125 K/UL
POTASSIUM SERPL-SCNC: 3.8 MMOL/L
PROT SERPL-MCNC: 7.3 G/DL
RBC # BLD: 4.34 M/UL
RBC # FLD: 13.2 %
SODIUM SERPL-SCNC: 141 MMOL/L
TRIGL SERPL-MCNC: 53 MG/DL
WBC # FLD AUTO: 6.34 K/UL

## 2021-06-18 LAB
ESTIMATED AVERAGE GLUCOSE: 114 MG/DL
HBA1C MFR BLD HPLC: 5.6 %
TSH SERPL-ACNC: 0.56 UIU/ML

## 2021-06-22 ENCOUNTER — APPOINTMENT (OUTPATIENT)
Dept: CARDIOLOGY | Facility: CLINIC | Age: 71
End: 2021-06-22
Payer: COMMERCIAL

## 2021-06-22 ENCOUNTER — RESULT CHARGE (OUTPATIENT)
Age: 71
End: 2021-06-22

## 2021-06-22 VITALS
SYSTOLIC BLOOD PRESSURE: 150 MMHG | DIASTOLIC BLOOD PRESSURE: 74 MMHG | WEIGHT: 230 LBS | BODY MASS INDEX: 26.61 KG/M2 | HEIGHT: 78 IN

## 2021-06-22 VITALS — SYSTOLIC BLOOD PRESSURE: 126 MMHG | DIASTOLIC BLOOD PRESSURE: 84 MMHG

## 2021-06-22 PROCEDURE — 93000 ELECTROCARDIOGRAM COMPLETE: CPT

## 2021-06-22 PROCEDURE — 99214 OFFICE O/P EST MOD 30 MIN: CPT

## 2021-06-22 PROCEDURE — 99072 ADDL SUPL MATRL&STAF TM PHE: CPT

## 2021-06-22 NOTE — PHYSICAL EXAM
[Well Developed] : well developed [Well Nourished] : well nourished [No Acute Distress] : no acute distress [Normal Conjunctiva] : normal conjunctiva [Normal Venous Pressure] : normal venous pressure [No Carotid Bruit] : no carotid bruit [Normal Rate] : normal [Irregularly Irregular] : irregularly irregular [Normal S1] : normal S1 [Normal S2] : normal S2 [II] : a grade 2 [___ +] : bilateral [unfilled]U+ pitting edema to the ankles [Clear Lung Fields] : clear lung fields [Good Air Entry] : good air entry [No Respiratory Distress] : no respiratory distress  [Soft] : abdomen soft [Non Tender] : non-tender [Normal Bowel Sounds] : normal bowel sounds [Normal Gait] : normal gait [No Cyanosis] : no cyanosis [No Clubbing] : no clubbing [No Varicosities] : no varicosities [Normal PT B/L] : normal PT B/L [No Rash] : no rash [Moves all extremities] : moves all extremities [Normal Speech] : normal speech [Alert and Oriented] : alert and oriented [Normal memory] : normal memory [S3] : no S3 [S4] : no S4 [Left Carotid Bruit] : no bruit heard over the left carotid [Right Carotid Bruit] : no bruit heard over the right carotid

## 2021-06-22 NOTE — REVIEW OF SYSTEMS
[Lower Ext Edema] : lower extremity edema [Negative] : Neurological [Palpitations] : no palpitations [Orthopnea] : no orthopnea [Syncope] : no syncope [Rash] : no rash [Anxiety] : no anxiety [Easy Bleeding] : no tendency for easy bleeding [Easy Bruising] : no tendency for easy bruising

## 2021-06-22 NOTE — ASSESSMENT
[FreeTextEntry1] : 70-yo male with h/o ischemic heart disease, hypertensive HD, diastolic CHF. New-onset A-fib in 2020 with CHF decompensation.\par [id number] Vasc score 4\par S/p MICHAEL/DCCV but reverted back to A-fib again. \par Patient appears euvolemic today.\par Episodes of bradycardia and lightheadedness, ? conversion to SR.\par \par \par Plan:\par Continue Carvedilol for now.\par Continue Furosemide and K-dur.\par MCOT for 4 weeks.\par F/u in 2 months.\par \par Leonardo Syed MD\par

## 2021-06-22 NOTE — HISTORY OF PRESENT ILLNESS
[FreeTextEntry1] : 71-yo male who developed progressive WALLS in 2012 and was found to have 2-vessel CAD. S/p OWEN of proximal LAD and LCX (both 3.5 mm Promus), complete resolution of all symptoms after that. \par \par Admitted to [facility] in 2016 with alcohol intoxication, uncontrolled HTN, CP. MPI normal.\par \par Lost to f/u for 2 years, mostly compliant with medications but BP very poorly controlled. Patient presented with weight gain, progressive WALLS, orthopnea and cough at night. No palpitations or CP.\par \par Improved with rate control and diuresis. MICHAEL/CV performed at [facility], patient converted back to A-fib by next visit. Declined additional attempts on cardioversion. Since last visit, patient woke up twice feeling very weak and lightheaded, his HR was 30-40/min, improved in 1-2 hours. Had to resume Furosemide due to immediate worsening of leg edema.\par \par 2D ECHO 08/18/20:\par LVEF 59%\par Mild LAE, NISHANT\par Mild MR, TR, PASP 60 \par \par GFR 87\par LDL 47\par TSH 0.56.

## 2021-06-23 ENCOUNTER — NON-APPOINTMENT (OUTPATIENT)
Age: 71
End: 2021-06-23

## 2021-06-25 ENCOUNTER — NON-APPOINTMENT (OUTPATIENT)
Age: 71
End: 2021-06-25

## 2021-07-23 ENCOUNTER — APPOINTMENT (OUTPATIENT)
Dept: CARDIOLOGY | Facility: CLINIC | Age: 71
End: 2021-07-23
Payer: COMMERCIAL

## 2021-07-23 VITALS
OXYGEN SATURATION: 95 % | HEART RATE: 79 BPM | BODY MASS INDEX: 36.1 KG/M2 | SYSTOLIC BLOOD PRESSURE: 148 MMHG | WEIGHT: 230 LBS | HEIGHT: 67 IN | DIASTOLIC BLOOD PRESSURE: 82 MMHG | TEMPERATURE: 98.3 F | RESPIRATION RATE: 16 BRPM

## 2021-07-23 PROCEDURE — 99205 OFFICE O/P NEW HI 60 MIN: CPT | Mod: 57

## 2021-07-23 PROCEDURE — 93000 ELECTROCARDIOGRAM COMPLETE: CPT

## 2021-07-23 RX ORDER — NITROGLYCERIN 0.4 MG/1
0.4 TABLET SUBLINGUAL
Qty: 100 | Refills: 0 | Status: DISCONTINUED | COMMUNITY
Start: 2019-10-07 | End: 2021-07-23

## 2021-07-23 RX ORDER — CARVEDILOL 6.25 MG/1
6.25 TABLET, FILM COATED ORAL DAILY
Refills: 0 | Status: DISCONTINUED | COMMUNITY
End: 2021-07-23

## 2021-08-24 ENCOUNTER — APPOINTMENT (OUTPATIENT)
Dept: CARDIOLOGY | Facility: CLINIC | Age: 71
End: 2021-08-24
Payer: COMMERCIAL

## 2021-08-24 VITALS
HEART RATE: 63 BPM | WEIGHT: 234 LBS | RESPIRATION RATE: 16 BRPM | BODY MASS INDEX: 36.73 KG/M2 | DIASTOLIC BLOOD PRESSURE: 70 MMHG | HEIGHT: 67 IN | SYSTOLIC BLOOD PRESSURE: 130 MMHG | TEMPERATURE: 98.3 F

## 2021-08-24 DIAGNOSIS — I11.0 HYPERTENSIVE HEART DISEASE WITH HEART FAILURE: ICD-10-CM

## 2021-08-24 DIAGNOSIS — Z00.00 ENCOUNTER FOR GENERAL ADULT MEDICAL EXAMINATION W/OUT ABNORMAL FINDINGS: ICD-10-CM

## 2021-08-24 PROCEDURE — 93000 ELECTROCARDIOGRAM COMPLETE: CPT

## 2021-08-24 PROCEDURE — 99214 OFFICE O/P EST MOD 30 MIN: CPT

## 2021-08-24 NOTE — PHYSICAL EXAM
[Well Developed] : well developed [Well Nourished] : well nourished [No Acute Distress] : no acute distress [Normal Conjunctiva] : normal conjunctiva [Normal Venous Pressure] : normal venous pressure [No Carotid Bruit] : no carotid bruit [Normal Rate] : normal [Irregularly Irregular] : irregularly irregular [Normal S1] : normal S1 [Normal S2] : normal S2 [II] : a grade 2 [___ +] : bilateral [unfilled]U+ pitting edema to the ankles [Clear Lung Fields] : clear lung fields [Good Air Entry] : good air entry [No Respiratory Distress] : no respiratory distress  [Soft] : abdomen soft [Non Tender] : non-tender [Normal Bowel Sounds] : normal bowel sounds [Normal Gait] : normal gait [No Cyanosis] : no cyanosis [No Clubbing] : no clubbing [No Varicosities] : no varicosities [Normal PT B/L] : normal PT B/L [No Rash] : no rash [Moves all extremities] : moves all extremities [Normal Speech] : normal speech [Alert and Oriented] : alert and oriented [Normal memory] : normal memory [S3] : no S3 [S4] : no S4 [Right Carotid Bruit] : no bruit heard over the right carotid [Left Carotid Bruit] : no bruit heard over the left carotid [No Focal Deficits] : no focal deficits

## 2021-08-24 NOTE — ASSESSMENT
[FreeTextEntry1] : 70-yo male with h/o ischemic heart disease, hypertensive HD, diastolic CHF. New-onset A-fib in 2020 with CHF decompensation.\par [id number] Vasc score 4\par S/p MICHAEL/DCCV but reverted back to A-fib again.  Slow VR has resolved after stopping Carvedilol.\par Patient appears euvolemic today.\par \par \par \par Plan:\par Continue treatment.\par Evaluated by EP, A-fib ablation recommended.\par Continue Furosemide 40 mg bid.\par Fasting blood work ordered.\par F/u in 2 months.\par \par Leonardo Syed MD\par

## 2021-08-24 NOTE — HISTORY OF PRESENT ILLNESS
[FreeTextEntry1] : 71-yo male with h/o 2-vessel CAD. S/p OWEN of proximal LAD and LCX (both 3.5 mm Promus), complete resolution of all symptoms after that. \par \par Lost to f/u for 2 years, mostly compliant with medications but BP very poorly controlled. Patient presented with weight gain, progressive WALLS, orthopnea and cough at night. No palpitations or CP.\par \par Improved with rate control and diuresis. Failed cardioversion. Progressive bradycardia with long pauses, Carvedilol stopped. Patient reports improvement of all symptoms. Still requires Furosemide twice a day (develops edema immediately if skips a dose).\par \par 2D ECHO 08/18/20:\par LVEF 59%\par Mild LAE, NISHANT\par Mild MR, TR, PASP 60 \par \par

## 2021-08-25 ENCOUNTER — RESULT CHARGE (OUTPATIENT)
Age: 71
End: 2021-08-25

## 2021-09-15 ENCOUNTER — OUTPATIENT (OUTPATIENT)
Dept: OUTPATIENT SERVICES | Facility: HOSPITAL | Age: 71
LOS: 1 days | Discharge: HOME | End: 2021-09-15
Payer: COMMERCIAL

## 2021-09-15 VITALS
OXYGEN SATURATION: 96 % | HEIGHT: 68 IN | TEMPERATURE: 97 F | HEART RATE: 64 BPM | WEIGHT: 229.94 LBS | RESPIRATION RATE: 18 BRPM | SYSTOLIC BLOOD PRESSURE: 153 MMHG | DIASTOLIC BLOOD PRESSURE: 76 MMHG

## 2021-09-15 DIAGNOSIS — I48.0 PAROXYSMAL ATRIAL FIBRILLATION: ICD-10-CM

## 2021-09-15 DIAGNOSIS — Z01.818 ENCOUNTER FOR OTHER PREPROCEDURAL EXAMINATION: ICD-10-CM

## 2021-09-15 LAB
ALBUMIN SERPL ELPH-MCNC: 4.6 G/DL — SIGNIFICANT CHANGE UP (ref 3.5–5.2)
ALP SERPL-CCNC: 95 U/L — SIGNIFICANT CHANGE UP (ref 30–115)
ALT FLD-CCNC: 18 U/L — SIGNIFICANT CHANGE UP (ref 0–41)
ANION GAP SERPL CALC-SCNC: 10 MMOL/L — SIGNIFICANT CHANGE UP (ref 7–14)
APTT BLD: 34.1 SEC — SIGNIFICANT CHANGE UP (ref 27–39.2)
AST SERPL-CCNC: 19 U/L — SIGNIFICANT CHANGE UP (ref 0–41)
BASOPHILS # BLD AUTO: 0.02 K/UL — SIGNIFICANT CHANGE UP (ref 0–0.2)
BASOPHILS NFR BLD AUTO: 0.4 % — SIGNIFICANT CHANGE UP (ref 0–1)
BILIRUB SERPL-MCNC: 0.9 MG/DL — SIGNIFICANT CHANGE UP (ref 0.2–1.2)
BUN SERPL-MCNC: 11 MG/DL — SIGNIFICANT CHANGE UP (ref 10–20)
CALCIUM SERPL-MCNC: 9.1 MG/DL — SIGNIFICANT CHANGE UP (ref 8.5–10.1)
CHLORIDE SERPL-SCNC: 104 MMOL/L — SIGNIFICANT CHANGE UP (ref 98–110)
CO2 SERPL-SCNC: 27 MMOL/L — SIGNIFICANT CHANGE UP (ref 17–32)
CREAT SERPL-MCNC: 0.7 MG/DL — SIGNIFICANT CHANGE UP (ref 0.7–1.5)
EOSINOPHIL # BLD AUTO: 0.04 K/UL — SIGNIFICANT CHANGE UP (ref 0–0.7)
EOSINOPHIL NFR BLD AUTO: 0.8 % — SIGNIFICANT CHANGE UP (ref 0–8)
GLUCOSE SERPL-MCNC: 102 MG/DL — HIGH (ref 70–99)
HCT VFR BLD CALC: 42.3 % — SIGNIFICANT CHANGE UP (ref 42–52)
HGB BLD-MCNC: 13.9 G/DL — LOW (ref 14–18)
IMM GRANULOCYTES NFR BLD AUTO: 0.2 % — SIGNIFICANT CHANGE UP (ref 0.1–0.3)
INR BLD: 1.18 RATIO — SIGNIFICANT CHANGE UP (ref 0.65–1.3)
LYMPHOCYTES # BLD AUTO: 2.15 K/UL — SIGNIFICANT CHANGE UP (ref 1.2–3.4)
LYMPHOCYTES # BLD AUTO: 41.3 % — SIGNIFICANT CHANGE UP (ref 20.5–51.1)
MCHC RBC-ENTMCNC: 29.8 PG — SIGNIFICANT CHANGE UP (ref 27–31)
MCHC RBC-ENTMCNC: 32.9 G/DL — SIGNIFICANT CHANGE UP (ref 32–37)
MCV RBC AUTO: 90.6 FL — SIGNIFICANT CHANGE UP (ref 80–94)
MONOCYTES # BLD AUTO: 0.38 K/UL — SIGNIFICANT CHANGE UP (ref 0.1–0.6)
MONOCYTES NFR BLD AUTO: 7.3 % — SIGNIFICANT CHANGE UP (ref 1.7–9.3)
NEUTROPHILS # BLD AUTO: 2.6 K/UL — SIGNIFICANT CHANGE UP (ref 1.4–6.5)
NEUTROPHILS NFR BLD AUTO: 50 % — SIGNIFICANT CHANGE UP (ref 42.2–75.2)
NRBC # BLD: 0 /100 WBCS — SIGNIFICANT CHANGE UP (ref 0–0)
PLATELET # BLD AUTO: 128 K/UL — LOW (ref 130–400)
POTASSIUM SERPL-MCNC: 4.1 MMOL/L — SIGNIFICANT CHANGE UP (ref 3.5–5)
POTASSIUM SERPL-SCNC: 4.1 MMOL/L — SIGNIFICANT CHANGE UP (ref 3.5–5)
PROT SERPL-MCNC: 7.4 G/DL — SIGNIFICANT CHANGE UP (ref 6–8)
PROTHROM AB SERPL-ACNC: 13.6 SEC — HIGH (ref 9.95–12.87)
RBC # BLD: 4.67 M/UL — LOW (ref 4.7–6.1)
RBC # FLD: 12.8 % — SIGNIFICANT CHANGE UP (ref 11.5–14.5)
SODIUM SERPL-SCNC: 141 MMOL/L — SIGNIFICANT CHANGE UP (ref 135–146)
WBC # BLD: 5.2 K/UL — SIGNIFICANT CHANGE UP (ref 4.8–10.8)
WBC # FLD AUTO: 5.2 K/UL — SIGNIFICANT CHANGE UP (ref 4.8–10.8)

## 2021-09-15 PROCEDURE — 93010 ELECTROCARDIOGRAM REPORT: CPT

## 2021-09-15 RX ORDER — TELMISARTAN AND HYDROCHLOROTHIAZIDE 40; 12.5 MG/1; MG/1
1 TABLET ORAL
Qty: 0 | Refills: 0 | DISCHARGE

## 2021-09-15 RX ORDER — CARVEDILOL PHOSPHATE 80 MG/1
1 CAPSULE, EXTENDED RELEASE ORAL
Qty: 0 | Refills: 0 | DISCHARGE

## 2021-09-15 RX ORDER — CLOPIDOGREL BISULFATE 75 MG/1
1 TABLET, FILM COATED ORAL
Qty: 0 | Refills: 0 | DISCHARGE

## 2021-09-15 RX ORDER — NITROGLYCERIN 6.5 MG
1 CAPSULE, EXTENDED RELEASE ORAL
Qty: 0 | Refills: 0 | DISCHARGE

## 2021-09-15 RX ORDER — DOXAZOSIN MESYLATE 4 MG
1 TABLET ORAL
Qty: 0 | Refills: 0 | DISCHARGE

## 2021-09-15 RX ORDER — APIXABAN 2.5 MG/1
1 TABLET, FILM COATED ORAL
Qty: 0 | Refills: 0 | DISCHARGE

## 2021-09-15 RX ORDER — FUROSEMIDE 40 MG
1 TABLET ORAL
Qty: 0 | Refills: 0 | DISCHARGE

## 2021-09-15 RX ORDER — ROSUVASTATIN CALCIUM 5 MG/1
1 TABLET ORAL
Qty: 0 | Refills: 0 | DISCHARGE

## 2021-09-15 RX ORDER — NIFEDIPINE 30 MG
1 TABLET, EXTENDED RELEASE 24 HR ORAL
Qty: 0 | Refills: 0 | DISCHARGE

## 2021-09-15 RX ORDER — POTASSIUM CHLORIDE 20 MEQ
20 PACKET (EA) ORAL
Qty: 0 | Refills: 0 | DISCHARGE

## 2021-09-15 NOTE — H&P PST ADULT - REASON FOR ADMISSION
Patient is a  71 year old  male polish speaking presenting to PAST in preparation for: AF ABLATION   on   under general  anesthesia by Dr. simmons Patient is a  71 year old  male  presenting to PAST in preparation for: AF ABLATION   on   under general  anesthesia by Dr. simmons

## 2021-09-15 NOTE — H&P PST ADULT - NSICDXPASTMEDICALHX_GEN_ALL_CORE_FT
PAST MEDICAL HISTORY:  Afib     Congestive heart failure     Coronary artery disease     Coronary stent patent     High cholesterol     Hypertension

## 2021-09-15 NOTE — H&P PST ADULT - HISTORY OF PRESENT ILLNESS
pt with hx afib  now fro planned procedure    PATIENT CURRENTLY DENIES CHEST PAIN  SHORTNESS OF BREATH  PALPITATIONS,  DYSURIA, OR UPPER RESPIRATORY INFECTION IN PAST 2 WEEKS  EXERCISE  TOLERANCE  1-2 FLIGHT OF STAIRS  WITHOUT SHORTNESS OF BREATH  denies travel outside the [place] in the past 30 days  Patient denies any signs or symptoms of COVID 19 and denies contact with known positive individuals.  They have an appointment for repeat COVID testing pre-procedure and acknowledge its time and place.  They were instructed to quarantine pre-procedure, practice exposure control measures, continue to self-monitor and report any concerns to their proceduralist.  pt advised self quarantine till day of procedure  Anesthesia Alert  NO--Difficult Airway  NO--History of neck surgery or radiation  NO--Limited ROM of neck  NO--History of Malignant hyperthermia  NO--No personal or family history of Pseudocholinesterase deficiency.  NO--Prior Anesthesia Complication  NO--Latex Allergy  NO--Loose teeth  NO--History of Rheumatoid Arthritis  NO--Bleeding risk  NO--LUIS FERNANDO  NO--Other_____    PT DENIES ANY RASHES, ABRASION, OR OPEN WOUNDS OR CUTS    AS PER THE PT, THIS IS HIS/HER COMPLETE MEDICAL AND SURGICAL HX, INCLUDING MEDICATIONS PRESCRIBED AND OVER THE COUNTER    Patient verbalized understanding of instructions and was given the opportunity to ask questions and have them answered.    pt denies any suicidal ideation or thoughts, pt states not a threat to self or others    I48 / CPT 92866 / 05057 / 69836 / 80889 / 80489 / 31542    Paroxysmal atrial fibrillation    Encounter for other preprocedural examination    ^I48 / CPT 89715 / 09986 / 91397 / 40201 / 58538 / 35562    Paroxysmal atrial fibrillation    Encounter for other preprocedural examination    Coronary stent patent    Afib    Congestive heart failure    Coronary artery disease    Hypertension    High cholesterol       pt with hx afib  now fro planned procedure    understands English if spoken to slowly and speaks English-     PATIENT CURRENTLY DENIES CHEST PAIN  SHORTNESS OF BREATH  PALPITATIONS,  DYSURIA, OR UPPER RESPIRATORY INFECTION IN PAST 2 WEEKS  EXERCISE  TOLERANCE  1-2 FLIGHT OF STAIRS  WITHOUT SHORTNESS OF BREATH  denies travel outside the [place] in the past 30 days  Patient denies any signs or symptoms of COVID 19 and denies contact with known positive individuals.  They have an appointment for repeat COVID testing pre-procedure and acknowledge its time and place.  They were instructed to quarantine pre-procedure, practice exposure control measures, continue to self-monitor and report any concerns to their proceduralist.  pt advised self quarantine till day of procedure  Anesthesia Alert  NO--Difficult Airway  NO--History of neck surgery or radiation  NO--Limited ROM of neck  NO--History of Malignant hyperthermia  NO--No personal or family history of Pseudocholinesterase deficiency.  NO--Prior Anesthesia Complication  NO--Latex Allergy  NO--Loose teeth  NO--History of Rheumatoid Arthritis  NO--Bleeding risk  NO--LUIS FERNANDO  NO--Other_____    PT DENIES ANY RASHES, ABRASION, OR OPEN WOUNDS OR CUTS    AS PER THE PT, THIS IS HIS/HER COMPLETE MEDICAL AND SURGICAL HX, INCLUDING MEDICATIONS PRESCRIBED AND OVER THE COUNTER    Patient verbalized understanding of instructions and was given the opportunity to ask questions and have them answered.    pt denies any suicidal ideation or thoughts, pt states not a threat to self or others    I48 / CPT 98023 / 18393 / 59759 / 98185 / 32667 / 29013    Paroxysmal atrial fibrillation    Encounter for other preprocedural examination    ^I48 / CPT 84319 / 19489 / 37016 / 27553 / 85023 / 81103    Paroxysmal atrial fibrillation    Encounter for other preprocedural examination    Coronary stent patent    Afib    Congestive heart failure    Coronary artery disease    Hypertension    High cholesterol

## 2021-09-15 NOTE — H&P PST ADULT - NSANTHOSAYNRD_GEN_A_CORE
No. LUIS FERNANDO screening performed.  STOP BANG Legend: 0-2 = LOW Risk; 3-4 = INTERMEDIATE Risk; 5-8 = HIGH Risk

## 2021-09-15 NOTE — H&P PST ADULT - ABILITY TO HEAR (WITH HEARING AID OR HEARING APPLIANCE IF NORMALLY USED):
Adequate: hears normal conversation without difficulty

## 2021-09-16 ENCOUNTER — RESULT REVIEW (OUTPATIENT)
Age: 71
End: 2021-09-16

## 2021-09-16 PROCEDURE — 71046 X-RAY EXAM CHEST 2 VIEWS: CPT | Mod: 26

## 2021-09-24 ENCOUNTER — OUTPATIENT (OUTPATIENT)
Dept: OUTPATIENT SERVICES | Facility: HOSPITAL | Age: 71
LOS: 1 days | Discharge: HOME | End: 2021-09-24

## 2021-09-24 DIAGNOSIS — Z11.59 ENCOUNTER FOR SCREENING FOR OTHER VIRAL DISEASES: ICD-10-CM

## 2021-09-27 ENCOUNTER — INPATIENT (INPATIENT)
Facility: HOSPITAL | Age: 71
LOS: 0 days | Discharge: HOME | End: 2021-09-28
Attending: INTERNAL MEDICINE | Admitting: INTERNAL MEDICINE
Payer: COMMERCIAL

## 2021-09-27 VITALS
DIASTOLIC BLOOD PRESSURE: 85 MMHG | HEART RATE: 165 BPM | WEIGHT: 239.2 LBS | SYSTOLIC BLOOD PRESSURE: 165 MMHG | TEMPERATURE: 97 F | RESPIRATION RATE: 18 BRPM | HEIGHT: 65 IN

## 2021-09-27 PROCEDURE — 93613 INTRACARDIAC EPHYS 3D MAPG: CPT

## 2021-09-27 PROCEDURE — 93325 DOPPLER ECHO COLOR FLOW MAPG: CPT | Mod: 26

## 2021-09-27 PROCEDURE — 93656 COMPRE EP EVAL ABLTJ ATR FIB: CPT

## 2021-09-27 PROCEDURE — 93623 PRGRMD STIMJ&PACG IV RX NFS: CPT | Mod: 26

## 2021-09-27 PROCEDURE — 93662 INTRACARDIAC ECG (ICE): CPT | Mod: 26

## 2021-09-27 PROCEDURE — 93312 ECHO TRANSESOPHAGEAL: CPT | Mod: 26

## 2021-09-27 PROCEDURE — 93320 DOPPLER ECHO COMPLETE: CPT | Mod: 26

## 2021-09-27 RX ORDER — MORPHINE SULFATE 50 MG/1
1 CAPSULE, EXTENDED RELEASE ORAL ONCE
Refills: 0 | Status: DISCONTINUED | OUTPATIENT
Start: 2021-09-27 | End: 2021-09-27

## 2021-09-27 RX ORDER — PANTOPRAZOLE SODIUM 20 MG/1
40 TABLET, DELAYED RELEASE ORAL DAILY
Refills: 0 | Status: DISCONTINUED | OUTPATIENT
Start: 2021-09-27 | End: 2021-09-28

## 2021-09-27 RX ORDER — ATORVASTATIN CALCIUM 80 MG/1
40 TABLET, FILM COATED ORAL AT BEDTIME
Refills: 0 | Status: DISCONTINUED | OUTPATIENT
Start: 2021-09-27 | End: 2021-09-28

## 2021-09-27 RX ORDER — NIFEDIPINE 30 MG
30 TABLET, EXTENDED RELEASE 24 HR ORAL DAILY
Refills: 0 | Status: DISCONTINUED | OUTPATIENT
Start: 2021-09-27 | End: 2021-09-28

## 2021-09-27 RX ORDER — DOXAZOSIN MESYLATE 4 MG
2 TABLET ORAL AT BEDTIME
Refills: 0 | Status: DISCONTINUED | OUTPATIENT
Start: 2021-09-27 | End: 2021-09-28

## 2021-09-27 RX ORDER — APIXABAN 2.5 MG/1
5 TABLET, FILM COATED ORAL
Refills: 0 | Status: DISCONTINUED | OUTPATIENT
Start: 2021-09-27 | End: 2021-09-28

## 2021-09-27 RX ORDER — FUROSEMIDE 40 MG
80 TABLET ORAL DAILY
Refills: 0 | Status: DISCONTINUED | OUTPATIENT
Start: 2021-09-27 | End: 2021-09-28

## 2021-09-27 RX ADMIN — ATORVASTATIN CALCIUM 40 MILLIGRAM(S): 80 TABLET, FILM COATED ORAL at 21:57

## 2021-09-27 RX ADMIN — Medication 2 MILLIGRAM(S): at 21:57

## 2021-09-27 RX ADMIN — APIXABAN 5 MILLIGRAM(S): 2.5 TABLET, FILM COATED ORAL at 20:10

## 2021-09-27 RX ADMIN — MORPHINE SULFATE 1 MILLIGRAM(S): 50 CAPSULE, EXTENDED RELEASE ORAL at 20:34

## 2021-09-27 NOTE — PROGRESS NOTE ADULT - SUBJECTIVE AND OBJECTIVE BOX
Electrophysiology Brief Post-Op Note      I have personally seen and examined the patient.  I agree with the history and physical which I have reviewed and noted any changes below.  09-27-21 @ 11:25    PRE-OP DIAGNOSIS: AF    POST-OP DIAGNOSIS: AF    PROCEDURE: ablation    Vendor Representative was present for clinical support.    Physician: Aditya  Assistant: None    ANESTHESIA TYPE:  [ X ]General Anesthesia  [  ] Sedation  [ X ] Local/Regional    ESTIMATED BLOOD LOSS:    60   mL    CONDITION  [  ] Critical  [  ] Serious  [  ]Fair  [ XGood      SPECIMENS REMOVED (IF APPLICABLE):  none  IMPLANTS (IF APPLICABLE)  none    FINDINGS: none    COMPLICATIONS: none     PLAN OF CARE  -	Start Eliquis 5 mg q12 tonight at 10 pm  -	Start protonix 40 mg daily tonight  -	Bed rest till am  -	Admit to telemetry

## 2021-09-27 NOTE — CHART NOTE - NSCHARTNOTEFT_GEN_A_CORE
PACU ANESTHESIA ADMISSION NOTE      Procedure:   Post op diagnosis:      ____  Intubated  TV:______       Rate: ______      FiO2: ______    __x__  Patent Airway    __x__  Full return of protective reflexes    __x__  Full recovery from anesthesia / back to baseline status    Vitals:  T(C): --  HR: --  BP: --  RR: --  SpO2: --    Mental Status:  __x__ Awake   ___x__ Alert   _____ Drowsy   _____ Sedated    Nausea/Vomiting:  __x__ NO  ______Yes,   See Post - Op Orders          Pain Scale (0-10):  _____    Treatment: ____ None    __x__ See Post - Op/PCA Orders    Post - Operative Fluids:   ____ Oral   __x__ See Post - Op Orders    Plan: Discharge:   ____Home       _X____Floor     _____Critical Care    _____  Other:_________________    Comments: Patient had smooth intraoperative event, no anesthesia complication.  PACU Vital signs: HR:  98          BP:  148      /   74       RR:    18         O2 Sat:     96  %     Temp 97.9f
Cardiac Electrophysiology Procedure Note  	  PROCEDURE:  Electrophysiological Study  Administration of Medicines  Intracardiac Ultrasound  Right and left atrial catheterization with dual transeptal approach  CryoAblation of pulmonary veins to treat Atrial Fibrillation  CS Cannulation  Computerized 3D mapping  Fluoroscopy      INDICATION:  Symptomatic persistent AF     Vendor Representative was present for clinical support.      OPERATORS:    Attending	 Joey Burgess MD    MATERIALS    Sheath	Insertion Site	Catheter	Location  12 F	RFV	Flex Cath	LA and RA  7 Fr	RFV	Daig Decapolar CS	Coronary Sinus  11 Fr	LFV	ICE	RA      ABLATION CATHETERS    Diameter	Size	Type	System  12 F	28 mm	Arctic Front Cryoballoon	Cryo      BASELINE FINDINGS    Rhythm	CL / Rate		QRS	QT	HV  AF	850                            100          410         45					    DESCRIPTION OF PROCEDURE    The patient was brought to the Procedure Room in a nonsedated and fasting state. Informed, written consent was obtained prior to the procedure. Anesthesia maintain comfort and analgesia throughout the procedure. Blood pressure, oxygenation and level of comfort were stable throughout. The right and left groin and right neck regions were cleaned and prepped with the applications of Chloraprep. Patient was then covered from head to toe with sterile drapes in the usual manner.     The left right inguinal areas and arterial pulse were defined; 30 cc of lidocaine solution were infiltrated into the skin overlying the area.     Next, using needle and guidewire, the right femoral vein was accessed once using modified Seldinger technique. All access was obtain using ultrasound guidance. The guidewires were followed up the IVC, right of the spine, to confirm venous access. One introducer sheath was placed into the femoral vein. The dilators and guidewires were removed. Then, using needle and guidewire, the left femoral vein was accessed Twice using modified Seldinger technique. The guidewires were followed up the IVC, right of the spine, to confirm venous access. Two introducer sheaths were placed into the femoral vein. The dilators and guidewires were removed. Esophageal temperature was monitored using esoahpgageal temperature probe. If Temperature decreased below 25C ablation was terminated.     Catheters were placed in the coronary sinus, at the high right atrial position, over the septal tricuspid valve area to obtain and confirm a proximal His signal, and at the RV apex. Diastolic thresholds were found to be adequate.   	  Baseline parameters were obtained and intervals were measured at these sites. Pacing at the HRA was performed to evaluate, AVN function and possible arrhythmia induction. Pacing performed at the RV apex to evaluate retrograde AVN function and evaluate for possible accessory pathways by evaluating retrograde atrial activation pattern and VH to A relationship.    A 10F AcuNav ICE ultrasound catheter was placed through the 11F sheath and positioned into the right atrium to allow visualization of the intra-atrial septum. One SL-1 sheaths were exchanged for the three 8F introducer sheath previously inserted in the right femoral vein. Single transeptal puncture was performed with BRK1 needle and one [place] 8 F degree SL-1 sheaths via the right femoral vein under direct visualization with intracardiac echocardiography and  fluoroscopy guidance. In both instances, a J wire was followed to the left subclavian vein and the sheath and dilator combo inserted to that level. The wire was exchanged for the BRK1 needle and pulled back under fluoroscopic visualization until the interatrial septum was reached. On all occasions, the sheath needle combo was placed over the septum and into the left atrium with needle advancement. Left atrial location was confirmed for each transeptal puncture with pressure monitoring, micro-bubble confirmation on ICE, after withdrawal of bright red blood from the catheter and with advancement of a guide wire into the left pulmonary vein. Left atrial pressure was measured at 10 mmHg. The SL-1 sheaths were exchanged over a J wire located in the left pulmonary vein for the Medtronic FlexCath adjustable catheter. Intravenous heparin was given prior to performing transeptal puncture and ACTs followed during the rest of the procedure to ensure an ACT greater than 300 secs.     Pulmonary veins diameter was assessed using a combination of ICE. A Lasso catheter was placed in the left atria and sequential mapping and ablation performed in the pulmonary veins using ICE and fluoroscopy guidance. NINOSKA 3D mapping was utilized to assess the position of the lasso catheter and visualized the CT scan of the LA to assess PV branches. Esophageal temperature was measure using a esophageal temperature probe and if temperature reached below 25 degrees, freezing was immediately terminated.    The achieve was inserted in to the one of the branches of each PV. The cryoballon (28 mm) was inflated and advanced to the os of each PV. Dye was injected and pressure was recorded to confirm adequate occlusion. Each PV was isolated. During the ablation, the patient did not experience symptoms. Each freeze was no longer than 3 minutes. If the slope of temperature descent was too steep or the temperature was < 50C the ablation was terminated. All PV were electrically isolated.    During the cryoablation of the right superior and inferior veins, CS catheter was inserted into the superior vena cava and the right phrenic nerve was paced at 1000ms cycle length. The movement of the diaphragm was manually monitored during the entire freeze and if the strength of contraction decreased the cryo was terminated.  during the ablation of the right superior pulmonary vein, when balloon was inflated but not frozen there was an error on the consult and we notice blood in the vacuum cord. The balloon was immediately removed from the body and second balloon was prepped and inserted. The defect the balloon was send back to the company.    Patient remained in AF at the end of eradicating of all the potentials in all the veins. Next patients was sedated using and externally cardioverted with 200J to SR     Exit block was documented by pacing from the each pole of the lasso catheter and Entrance block was evident when pacing form the distal CS. All PV were electrically  isolated.      Ventricular pacing showed VA conduction at <300 ms CL. Atrial pacing showed AVN WBCL was 290 msec. The PV velocity by ICE Doppler was 0.5 m/sec for the LSPV and 0.4 m/sec for the LIPV at the end of the procedure. A limited transthorasic echocardiogram was performed and no evidence of significant pericardial effusion was seen.    The catheters were removed and the sheaths pulled after a figure-8 stitch was placed. A dry, sterile dressing was placed over this. The patient was returned to a hospital room in stable condition. Gauze and needle count were performed and found to be consistent at the end of the procedure      COMPLICATIONS:    The patient tolerated the procedure well. There were no immediate complications. No evidence of pulmonary vein stenosis.      CONCLUSIONS:    Successful Cryoablation of the left superior, right superior, left inferior and right inferior pulmonary veins with ablation of PV potentials and entrance / exit block as endpoints. No evidence of induced atrial fibrillation.

## 2021-09-28 ENCOUNTER — TRANSCRIPTION ENCOUNTER (OUTPATIENT)
Age: 71
End: 2021-09-28

## 2021-09-28 VITALS
HEART RATE: 73 BPM | WEIGHT: 240.08 LBS | DIASTOLIC BLOOD PRESSURE: 74 MMHG | SYSTOLIC BLOOD PRESSURE: 158 MMHG | RESPIRATION RATE: 18 BRPM | TEMPERATURE: 98 F

## 2021-09-28 LAB
COVID-19 SPIKE DOMAIN AB INTERP: NEGATIVE — SIGNIFICANT CHANGE UP
COVID-19 SPIKE DOMAIN ANTIBODY RESULT: 0.4 U/ML — SIGNIFICANT CHANGE UP
SARS-COV-2 IGG+IGM SERPL QL IA: 0.4 U/ML — SIGNIFICANT CHANGE UP
SARS-COV-2 IGG+IGM SERPL QL IA: NEGATIVE — SIGNIFICANT CHANGE UP

## 2021-09-28 PROCEDURE — 93010 ELECTROCARDIOGRAM REPORT: CPT

## 2021-09-28 RX ORDER — PANTOPRAZOLE SODIUM 20 MG/1
1 TABLET, DELAYED RELEASE ORAL
Qty: 30 | Refills: 0
Start: 2021-09-28 | End: 2021-10-27

## 2021-09-28 RX ADMIN — Medication 30 MILLIGRAM(S): at 05:21

## 2021-09-28 RX ADMIN — Medication 80 MILLIGRAM(S): at 05:21

## 2021-09-28 RX ADMIN — APIXABAN 5 MILLIGRAM(S): 2.5 TABLET, FILM COATED ORAL at 05:21

## 2021-09-28 NOTE — DISCHARGE NOTE PROVIDER - CARE PROVIDER_API CALL
Joey Burgess; JEREMY)  [facility]  [address] [place] [address]  Phone: [phone]  Fax: [phone]  Scheduled Appointment: 10/29/2021 01:00 PM

## 2021-09-28 NOTE — PROGRESS NOTE ADULT - ASSESSMENT
EP: Dr. Burgess    A/P  Patient S/P afib Ablation  - continue Eliquis   - protonix 40 mg daily x 30 days  - No heavy lifting  - shower today is allowed  - no bath/swimming x 1 week  - FU in office in 3-4 weeks  - d/c home today

## 2021-09-28 NOTE — DISCHARGE NOTE NURSING/CASE MANAGEMENT/SOCIAL WORK - PATIENT PORTAL LINK FT
You can access the FollowMyHealth Patient Portal offered by [facility] by registering at the following website: [email or web address]. By joining [employer]’s FollowMyHealth portal, you will also be able to view your health information using other applications (apps) compatible with our system.

## 2021-09-28 NOTE — PROGRESS NOTE ADULT - SUBJECTIVE AND OBJECTIVE BOX
INTERVAL HPI/OVERNIGHT EVENTS:    Patient s/p afib  ablation.   No events over night  Patient is in NSR    MEDICATIONS  (STANDING):  apixaban 5 milliGRAM(s) Oral two times a day  atorvastatin 40 milliGRAM(s) Oral at bedtime  doxazosin 2 milliGRAM(s) Oral at bedtime  furosemide    Tablet 80 milliGRAM(s) Oral daily  NIFEdipine XL 30 milliGRAM(s) Oral daily  pantoprazole  Injectable 40 milliGRAM(s) IV Push daily    MEDICATIONS  (PRN):      Allergies    No Known Allergies    Intolerances    Vital Signs Last 24 Hrs  T(C): 36.5 (28 Sep 2021 05:22), Max: 36.5 (28 Sep 2021 05:22)  T(F): 97.7 (28 Sep 2021 05:22), Max: 97.7 (28 Sep 2021 05:22)  HR: 73 (28 Sep 2021 05:22) (65 - 73)  BP: 158/74 (28 Sep 2021 05:22) (158/74 - 165/85)  BP(mean): --  RR: 18 (28 Sep 2021 05:22) (18 - 18)  SpO2: --    HEENT ZOIE EOMI  Lung CTAB  Heart RRR normal S1 S2  abd +BS soft  groins No hematoma, no bleeding; No stitches  Ext no C/C/E

## 2021-09-28 NOTE — DISCHARGE NOTE PROVIDER - NSDCFUSCHEDAPPT_GEN_ALL_CORE_FT
MAE FOX ; 10/19/2021 ; [facility] [address] MAE Cochran ; 10/29/2021 ; [facility] [address]

## 2021-09-28 NOTE — DISCHARGE NOTE PROVIDER - NSDCMRMEDTOKEN_GEN_ALL_CORE_FT
doxazosin 2 mg oral tablet: 1 tab(s) orally once a day (in the evening)  Eliquis 5 mg oral tablet: 1 tab(s) orally 2 times a day  Lasix 80 mg oral tablet: 1 tab(s) orally once a day  Micardis HCT 80 mg-12.5 mg oral tablet: 1 tab(s) orally once a day  NIFEdipine (Eqv-Procardia XL) 30 mg oral tablet, extended release: 1 tab(s) orally once a day  pantoprazole 40 mg oral delayed release tablet: 1 tab(s) orally once a day   potassium chloride 20 mEq oral granule, extended release: 20 milliequivalent(s) orally once a day  rosuvastatin 10 mg oral tablet: 1 tab(s) orally once a day

## 2021-09-28 NOTE — DISCHARGE NOTE PROVIDER - CARE PROVIDERS DIRECT ADDRESSES
[email or web address]

## 2021-10-02 DIAGNOSIS — I25.10 ATHEROSCLEROTIC HEART DISEASE OF NATIVE CORONARY ARTERY WITHOUT ANGINA PECTORIS: ICD-10-CM

## 2021-10-02 DIAGNOSIS — I48.19 OTHER PERSISTENT ATRIAL FIBRILLATION: ICD-10-CM

## 2021-10-02 DIAGNOSIS — E66.9 OBESITY, UNSPECIFIED: ICD-10-CM

## 2021-10-02 DIAGNOSIS — I50.9 HEART FAILURE, UNSPECIFIED: ICD-10-CM

## 2021-10-02 DIAGNOSIS — E78.00 PURE HYPERCHOLESTEROLEMIA, UNSPECIFIED: ICD-10-CM

## 2021-10-02 DIAGNOSIS — Z87.891 PERSONAL HISTORY OF NICOTINE DEPENDENCE: ICD-10-CM

## 2021-10-02 DIAGNOSIS — I11.0 HYPERTENSIVE HEART DISEASE WITH HEART FAILURE: ICD-10-CM

## 2021-10-02 DIAGNOSIS — Z79.01 LONG TERM (CURRENT) USE OF ANTICOAGULANTS: ICD-10-CM

## 2021-10-02 DIAGNOSIS — Z95.5 PRESENCE OF CORONARY ANGIOPLASTY IMPLANT AND GRAFT: ICD-10-CM

## 2021-10-17 NOTE — ASSESSMENT
[FreeTextEntry1] : AF ablation\par \par I have discussed different treatment options with MAE FOX including anti-arrhythmic medications or ablation.  After a long discussion, the patient would like to proceed with an ablation.  I have explained the risks and benefits of the procedure to the patient.  There is approximately 1-2% chance of any major cardiovascular complication to occur. Complications include, but are not limited to infection, bleeding, damage to the vessels, hole in the heart, stroke, death and heart attack. There is also 1% risk of phrenic nerve injury.  The patient understands the risk and would like to proceed with the procedure. Patient had opportunity to ask questions. Patient indicated that all of his questions were answered to his satisfaction and verbalized understanding.\par \par

## 2021-10-17 NOTE — HISTORY OF PRESENT ILLNESS
[FreeTextEntry1] : 71-yo male who developed progressive WALLS in 2012 and was found to have 2-vessel CAD. S/p OWEN of proximal LAD and LCX (both 3.5 mm Promus), complete resolution of all symptoms after that.  Admitted to [facility] in 2016 with alcohol intoxication, HTN, persistent AF CHADVSC 3.MICHAEL/CV performed at [facility].  Patient found to have AF with slow ventricular response. \par \par \par TSH 0.56\par \par EKG AF 70 bpm

## 2021-10-18 LAB
ALBUMIN SERPL ELPH-MCNC: 4.3 G/DL
ALP BLD-CCNC: 84 U/L
ALT SERPL-CCNC: 16 U/L
ANION GAP SERPL CALC-SCNC: 14 MMOL/L
AST SERPL-CCNC: 21 U/L
BASOPHILS # BLD AUTO: 0.02 K/UL
BASOPHILS NFR BLD AUTO: 0.3 %
BILIRUB SERPL-MCNC: 1 MG/DL
BUN SERPL-MCNC: 14 MG/DL
CALCIUM SERPL-MCNC: 9.1 MG/DL
CHLORIDE SERPL-SCNC: 105 MMOL/L
CHOLEST SERPL-MCNC: 142 MG/DL
CO2 SERPL-SCNC: 23 MMOL/L
CREAT SERPL-MCNC: 0.9 MG/DL
EOSINOPHIL # BLD AUTO: 0.07 K/UL
EOSINOPHIL NFR BLD AUTO: 1.2 %
ESTIMATED AVERAGE GLUCOSE: 114 MG/DL
GLUCOSE SERPL-MCNC: 117 MG/DL
HBA1C MFR BLD HPLC: 5.6 %
HCT VFR BLD CALC: 42.8 %
HDLC SERPL-MCNC: 43 MG/DL
HGB BLD-MCNC: 13.6 G/DL
IMM GRANULOCYTES NFR BLD AUTO: 0.3 %
LDLC SERPL CALC-MCNC: 84 MG/DL
LYMPHOCYTES # BLD AUTO: 2.98 K/UL
LYMPHOCYTES NFR BLD AUTO: 49.3 %
MAN DIFF?: NORMAL
MCHC RBC-ENTMCNC: 30.1 PG
MCHC RBC-ENTMCNC: 31.8 G/DL
MCV RBC AUTO: 94.7 FL
MONOCYTES # BLD AUTO: 0.47 K/UL
MONOCYTES NFR BLD AUTO: 7.8 %
NEUTROPHILS # BLD AUTO: 2.48 K/UL
NEUTROPHILS NFR BLD AUTO: 41.1 %
NONHDLC SERPL-MCNC: 99 MG/DL
PLATELET # BLD AUTO: 127 K/UL
POTASSIUM SERPL-SCNC: 4.2 MMOL/L
PROT SERPL-MCNC: 6.9 G/DL
RBC # BLD: 4.52 M/UL
RBC # FLD: 13.1 %
SODIUM SERPL-SCNC: 142 MMOL/L
TRIGL SERPL-MCNC: 76 MG/DL
TSH SERPL-ACNC: 0.23 UIU/ML
WBC # FLD AUTO: 6.04 K/UL

## 2021-10-19 ENCOUNTER — APPOINTMENT (OUTPATIENT)
Dept: CARDIOLOGY | Facility: CLINIC | Age: 71
End: 2021-10-19
Payer: COMMERCIAL

## 2021-10-19 ENCOUNTER — RESULT CHARGE (OUTPATIENT)
Age: 71
End: 2021-10-19

## 2021-10-19 VITALS — SYSTOLIC BLOOD PRESSURE: 178 MMHG | DIASTOLIC BLOOD PRESSURE: 90 MMHG

## 2021-10-19 VITALS
HEIGHT: 67 IN | SYSTOLIC BLOOD PRESSURE: 180 MMHG | WEIGHT: 234 LBS | DIASTOLIC BLOOD PRESSURE: 84 MMHG | BODY MASS INDEX: 36.73 KG/M2

## 2021-10-19 DIAGNOSIS — E78.00 PURE HYPERCHOLESTEROLEMIA, UNSPECIFIED: ICD-10-CM

## 2021-10-19 PROCEDURE — 93000 ELECTROCARDIOGRAM COMPLETE: CPT

## 2021-10-19 PROCEDURE — 99214 OFFICE O/P EST MOD 30 MIN: CPT

## 2021-10-19 NOTE — PHYSICAL EXAM
[Well Developed] : well developed [Well Nourished] : well nourished [No Acute Distress] : no acute distress [Normal Conjunctiva] : normal conjunctiva [Normal Venous Pressure] : normal venous pressure [No Carotid Bruit] : no carotid bruit [Normal Rate] : normal [Irregularly Irregular] : irregularly irregular [Normal S1] : normal S1 [Normal S2] : normal S2 [II] : a grade 2 [___ +] : bilateral [unfilled]U+ pitting edema to the ankles [Clear Lung Fields] : clear lung fields [Good Air Entry] : good air entry [No Respiratory Distress] : no respiratory distress  [Soft] : abdomen soft [Non Tender] : non-tender [Normal Bowel Sounds] : normal bowel sounds [Normal Gait] : normal gait [No Cyanosis] : no cyanosis [No Clubbing] : no clubbing [No Varicosities] : no varicosities [No Rash] : no rash [Normal PT B/L] : normal PT B/L [Moves all extremities] : moves all extremities [No Focal Deficits] : no focal deficits [Normal Speech] : normal speech [Alert and Oriented] : alert and oriented [Normal memory] : normal memory [S3] : no S3 [S4] : no S4 [Left Carotid Bruit] : no bruit heard over the left carotid [Right Carotid Bruit] : no bruit heard over the right carotid

## 2021-10-19 NOTE — HISTORY OF PRESENT ILLNESS
[FreeTextEntry1] : 71-yo male with h/o 2-vessel CAD. S/p OWEN of proximal LAD and LCX (both 3.5 mm Promus), complete resolution of all symptoms after that. \par \par Lost to f/u for 2 years, mostly compliant with medications but BP very poorly controlled. Patient presented with weight gain, progressive WALLS, orthopnea and cough at night. \par \par Improved with rate control and diuresis. Failed cardioversion. Progressive bradycardia with long pauses prior to A-fib ablation. \par \par Denies SOB, edema, cough.\par \par 2D ECHO 08/18/20:\par LVEF 59%\par Mild LAE, NISHANT\par Mild MR, TR, PASP 60 \par \par

## 2021-10-19 NOTE — ASSESSMENT
[FreeTextEntry1] : 70-yo male with h/o ischemic heart disease, hypertensive HD, diastolic CHF. New-onset A-fib in 2020 with CHF decompensation.\par [id number] Vasc score 4\par S/p A-fib ablation, back in A-fib today.\par Patient appears euvolemic today but HTN is uncontrolled again.\par \par \par \par Plan:\par Will try to replace Furosemide with Chlorthalidone 50 mg daily.\par Continue Eliquis.\par EP f/u scheduled in 2 days. May need PPM in order to use antiarrhythmic medications.\par F/u in 2 months.\par \par Leonardo Syed MD\par

## 2021-10-19 NOTE — REVIEW OF SYSTEMS
[Negative] : Neurological [Lower Ext Edema] : no extremity edema [Palpitations] : no palpitations [Orthopnea] : no orthopnea [Syncope] : no syncope [Rash] : no rash [Anxiety] : no anxiety [Easy Bleeding] : no tendency for easy bleeding [Easy Bruising] : no tendency for easy bruising

## 2021-10-21 ENCOUNTER — APPOINTMENT (OUTPATIENT)
Dept: CARDIOLOGY | Facility: CLINIC | Age: 71
End: 2021-10-21
Payer: COMMERCIAL

## 2021-10-21 PROCEDURE — 99214 OFFICE O/P EST MOD 30 MIN: CPT

## 2021-10-21 PROCEDURE — 93000 ELECTROCARDIOGRAM COMPLETE: CPT

## 2021-10-21 NOTE — ASSESSMENT
[FreeTextEntry1] : AF ablation\par \par - cotn AC\par - DCCV/MICHAEL\par - If patient will require antiarrhythmic medications for maintenance of normal sinus rhythm then patient most likely will require a pacemaker implant. Will evaluate patient heart rate after cardioversion. Will discussed possibility of pacemaker implant next visit.\par \par

## 2021-10-21 NOTE — HISTORY OF PRESENT ILLNESS
[FreeTextEntry1] : 71-yo male who developed progressive WALLS in 2012 and was found to have 2-vessel CAD. S/p OWEN of proximal LAD and LCX (both 3.5 mm Promus), complete resolution of all symptoms after that.  Admitted to [facility] in 2016 with alcohol intoxication, HTN, persistent AF CHADVSC 3.MICHAEL/CV performed at [facility].  Patient found to have AF with slow ventricular response. \par \par \par \par Patietns/p AF ablation 10/21. Patient feels great and much better then before ablation. However converted to AF. \par \par \par \par EKG AF 83 bpm

## 2021-12-28 ENCOUNTER — APPOINTMENT (OUTPATIENT)
Dept: CARDIOLOGY | Facility: CLINIC | Age: 71
End: 2021-12-28
Payer: COMMERCIAL

## 2021-12-28 VITALS
BODY MASS INDEX: 36.41 KG/M2 | SYSTOLIC BLOOD PRESSURE: 125 MMHG | DIASTOLIC BLOOD PRESSURE: 60 MMHG | HEIGHT: 67 IN | WEIGHT: 232 LBS

## 2021-12-28 DIAGNOSIS — I25.9 CHRONIC ISCHEMIC HEART DISEASE, UNSPECIFIED: ICD-10-CM

## 2021-12-28 DIAGNOSIS — I10 ESSENTIAL (PRIMARY) HYPERTENSION: ICD-10-CM

## 2021-12-28 DIAGNOSIS — I50.32 CHRONIC DIASTOLIC (CONGESTIVE) HEART FAILURE: ICD-10-CM

## 2021-12-28 PROCEDURE — 93000 ELECTROCARDIOGRAM COMPLETE: CPT

## 2021-12-28 PROCEDURE — 99214 OFFICE O/P EST MOD 30 MIN: CPT

## 2021-12-28 RX ORDER — FUROSEMIDE 40 MG/1
40 TABLET ORAL
Qty: 180 | Refills: 1 | Status: DISCONTINUED | COMMUNITY
End: 2021-12-28

## 2021-12-28 NOTE — ASSESSMENT
[FreeTextEntry1] : 70-yo male with h/o ischemic heart disease, hypertensive HD, diastolic CHF. New-onset A-fib in 2020 with CHF decompensation.\par [id number] Vasc score 4\par S/p A-fib ablation, back in A-fib now.\par Patient appears euvolemic today, HTN controlled.\par \par \par \par Plan:\par Reduce Procardia to 30 mg daily.\par Continue the rest of his BP medications.\par Continue Eliquis.\par EP f/u scheduled in January. Patient advised to proceed with cardioversion.\par Fasting blood work ordered.\par F/u in 3 months.\par \par Leonardo Syed MD\par

## 2021-12-28 NOTE — HISTORY OF PRESENT ILLNESS
[FreeTextEntry1] : 71-yo male with h/o 2-vessel CAD. S/p OWEN of proximal LAD and LCX (both 3.5 mm Promus), complete resolution of all symptoms after that. \par \par Lost to f/u for 2 years, mostly compliant with medications but BP very poorly controlled. Patient presented with weight gain, progressive WALLS, orthopnea and cough at night. \par \par Improved with rate control and diuresis. Failed cardioversion. Progressive bradycardia with long pauses prior to A-fib ablation. \par \par Denies SOB, edema, cough. BP has been low on several occasions.\par \par 2D ECHO 08/18/20:\par LVEF 59%\par Mild LAE, NISHANT\par Mild MR, TR, PASP 60 \par \par  Statement Selected

## 2021-12-28 NOTE — PHYSICAL EXAM
[Well Developed] : well developed [No Acute Distress] : no acute distress [Normal Conjunctiva] : normal conjunctiva [Normal Venous Pressure] : normal venous pressure [No Carotid Bruit] : no carotid bruit [Normal Rate] : normal [Irregularly Irregular] : irregularly irregular [Normal S1] : normal S1 [Normal S2] : normal S2 [II] : a grade 2 [___ +] : bilateral [unfilled]U+ pitting edema to the ankles [Clear Lung Fields] : clear lung fields [Good Air Entry] : good air entry [No Respiratory Distress] : no respiratory distress  [Soft] : abdomen soft [Non Tender] : non-tender [Normal Bowel Sounds] : normal bowel sounds [Normal Gait] : normal gait [No Cyanosis] : no cyanosis [No Clubbing] : no clubbing [No Varicosities] : no varicosities [Normal PT B/L] : normal PT B/L [No Rash] : no rash [Moves all extremities] : moves all extremities [No Focal Deficits] : no focal deficits [Normal Speech] : normal speech [Alert and Oriented] : alert and oriented [Normal memory] : normal memory [Obese] : obese [S3] : no S3 [S4] : no S4 [Right Carotid Bruit] : no bruit heard over the right carotid [Left Carotid Bruit] : no bruit heard over the left carotid

## 2022-01-12 ENCOUNTER — APPOINTMENT (OUTPATIENT)
Dept: CARDIOLOGY | Facility: CLINIC | Age: 72
End: 2022-01-12
Payer: COMMERCIAL

## 2022-01-12 VITALS
HEIGHT: 68 IN | HEART RATE: 97 BPM | BODY MASS INDEX: 35.16 KG/M2 | TEMPERATURE: 98 F | WEIGHT: 232 LBS | DIASTOLIC BLOOD PRESSURE: 77 MMHG | SYSTOLIC BLOOD PRESSURE: 155 MMHG

## 2022-01-12 DIAGNOSIS — I48.91 UNSPECIFIED ATRIAL FIBRILLATION: ICD-10-CM

## 2022-01-12 DIAGNOSIS — R00.1 BRADYCARDIA, UNSPECIFIED: ICD-10-CM

## 2022-01-12 PROCEDURE — 93000 ELECTROCARDIOGRAM COMPLETE: CPT

## 2022-01-12 PROCEDURE — 99214 OFFICE O/P EST MOD 30 MIN: CPT

## 2022-01-12 NOTE — HISTORY OF PRESENT ILLNESS
[FreeTextEntry1] : 71-yo male who developed progressive WALLS in 2012 and was found to have 2-vessel CAD. S/p OWEN of proximal LAD and LCX (both 3.5 mm Promus), complete resolution of all symptoms after that.  Admitted to [facility] in 2016 with alcohol intoxication, HTN, persistent AF CHADVSC 3.MICHAEL/CV performed at [facility].  Patient found to have AF with slow ventricular response. \par \par \par \par Patient s/p AF ablation 10/21. Patient feels great and much better then before ablation. However converted to AF. \par \par Patient feels well. No palpitations and no SOb\par \par EKG AF 79 bpm

## 2022-01-12 NOTE — ASSESSMENT
[FreeTextEntry1] : AF ablation\par \par - cotn AC\par - patient wants to wait and not go for MICHAEL/DCCV at this time. He want to proceed with rate control\par \par bradycardia\par - doing well\par - cont to monitor \par \par

## 2022-01-27 NOTE — ASU PATIENT PROFILE, ADULT - NS PRO TALK SOMEONE YN
[FreeTextEntry1] : elevated PSA \par reports PSA previous 4-7\par recurrent UTIs\par stronginterst in implant\par trial flomax 0.4\par augmentin 875 bid x 14 days\par MRI prostate\par f/u 4 weeks\par 
no

## 2022-03-31 ENCOUNTER — APPOINTMENT (OUTPATIENT)
Dept: CARDIOLOGY | Facility: CLINIC | Age: 72
End: 2022-03-31

## 2022-04-08 ENCOUNTER — APPOINTMENT (OUTPATIENT)
Dept: CARDIOLOGY | Facility: CLINIC | Age: 72
End: 2022-04-08

## 2022-08-26 ENCOUNTER — RX RENEWAL (OUTPATIENT)
Age: 72
End: 2022-08-26

## 2022-08-26 RX ORDER — DOXAZOSIN 2 MG/1
2 TABLET ORAL DAILY
Qty: 90 | Refills: 0 | Status: ACTIVE | COMMUNITY
Start: 2022-08-26 | End: 1900-01-01

## 2022-08-26 RX ORDER — CHLORTHALIDONE 50 MG/1
50 TABLET ORAL DAILY
Qty: 90 | Refills: 0 | Status: ACTIVE | COMMUNITY
Start: 2022-08-26 | End: 1900-01-01

## 2022-08-26 RX ORDER — NIFEDIPINE 30 MG/1
30 TABLET, EXTENDED RELEASE ORAL DAILY
Qty: 90 | Refills: 0 | Status: ACTIVE | COMMUNITY
Start: 2022-08-26 | End: 1900-01-01

## 2022-11-15 ENCOUNTER — RX RENEWAL (OUTPATIENT)
Age: 72
End: 2022-11-15

## 2022-11-16 RX ORDER — TELMISARTAN 80 MG/1
80 TABLET ORAL DAILY
Qty: 90 | Refills: 0 | Status: ACTIVE | COMMUNITY
Start: 2022-08-26 | End: 1900-01-01

## 2022-11-16 RX ORDER — ROSUVASTATIN CALCIUM 10 MG/1
10 TABLET, FILM COATED ORAL
Qty: 90 | Refills: 0 | Status: ACTIVE | COMMUNITY
Start: 2022-08-26 | End: 1900-01-01

## 2024-02-28 NOTE — DISCHARGE NOTE NURSING/CASE MANAGEMENT/SOCIAL WORK - NSPROEXTENSIONSOFSELF_GEN_A_NUR
Received report from Cath lab nurse, pt arrived on unit, VN notified, VS taken. Instructed pt to call for assistance when ambulating    
none